# Patient Record
Sex: MALE | Race: WHITE | Employment: OTHER | URBAN - METROPOLITAN AREA
[De-identification: names, ages, dates, MRNs, and addresses within clinical notes are randomized per-mention and may not be internally consistent; named-entity substitution may affect disease eponyms.]

---

## 2017-04-14 RX ORDER — FUROSEMIDE 20 MG/1
20 TABLET ORAL DAILY
Qty: 30 TAB | Refills: 11 | Status: SHIPPED | OUTPATIENT
Start: 2017-04-14 | End: 2018-08-25 | Stop reason: SDUPTHER

## 2017-04-14 NOTE — TELEPHONE ENCOUNTER
Patient is having constant discomfort in his legs and is asking if that could be because of his medications or an indication of something else cardiac wise. Please call to discuss further. Thanks!

## 2017-04-14 NOTE — TELEPHONE ENCOUNTER
Verified patient with two identifiers. Spoke with pt, c/o legs are hurting, tight, pressure squeezing feeling. Some edema noted, was as PCP yesterday and was told BP normal, didn't ask about legs. No c/o pain, or sob. Please advise.

## 2017-04-14 NOTE — TELEPHONE ENCOUNTER
Verified patient with two identifiers. Spoke with pt, he has stopped Lasix several months ago. Advised pt to start up 20 mg daily, and call office in one week to report. Pt verbalized understanding. Lisseth Smith ANP   You 9 minutes ago (1:13 PM)                 When we saw him last he was on lasix 20 mg daily. We increased x 3 days then backed down. If he is still on daily lasix 20 mg, he may take 40mg daily x 3 days then back to 20 mg. He should have a follow up soon.

## 2017-04-19 ENCOUNTER — HOSPITAL ENCOUNTER (OUTPATIENT)
Dept: ULTRASOUND IMAGING | Age: 52
Discharge: HOME | End: 2017-04-19
Payer: COMMERCIAL

## 2017-04-19 DIAGNOSIS — R60.9 PERIPHERAL EDEMA: ICD-10-CM

## 2017-04-19 DIAGNOSIS — M79.606 LEG PAIN: ICD-10-CM

## 2017-04-19 PROCEDURE — 93970 EXTREMITY STUDY: CPT

## 2017-05-10 ENCOUNTER — OFFICE VISIT (OUTPATIENT)
Dept: CARDIOLOGY CLINIC | Age: 52
End: 2017-05-10

## 2017-05-10 VITALS
RESPIRATION RATE: 18 BRPM | WEIGHT: 301 LBS | HEART RATE: 86 BPM | DIASTOLIC BLOOD PRESSURE: 90 MMHG | SYSTOLIC BLOOD PRESSURE: 122 MMHG | OXYGEN SATURATION: 95 % | HEIGHT: 72 IN | BODY MASS INDEX: 40.77 KG/M2

## 2017-05-10 DIAGNOSIS — Z95.5 S/P CORONARY ARTERY STENT PLACEMENT: ICD-10-CM

## 2017-05-10 DIAGNOSIS — E11.9 TYPE 2 DIABETES MELLITUS WITHOUT COMPLICATION, UNSPECIFIED LONG TERM INSULIN USE STATUS: ICD-10-CM

## 2017-05-10 DIAGNOSIS — I10 ESSENTIAL HYPERTENSION WITH GOAL BLOOD PRESSURE LESS THAN 130/80: Primary | ICD-10-CM

## 2017-05-10 DIAGNOSIS — Z82.49 FAMILY HISTORY OF EARLY CAD: ICD-10-CM

## 2017-05-10 DIAGNOSIS — R60.9 DEPENDENT EDEMA: ICD-10-CM

## 2017-05-10 RX ORDER — METFORMIN HYDROCHLORIDE 500 MG/1
TABLET, EXTENDED RELEASE ORAL
Refills: 1 | COMMUNITY
Start: 2017-05-02 | End: 2021-10-04

## 2017-05-10 RX ORDER — TADALAFIL 20 MG/1
TABLET, FILM COATED ORAL
Refills: 6 | COMMUNITY
Start: 2017-04-18

## 2017-05-10 RX ORDER — AMLODIPINE BESYLATE 2.5 MG/1
2.5 TABLET ORAL DAILY
Qty: 30 TAB | Refills: 6 | Status: SHIPPED | OUTPATIENT
Start: 2017-05-10 | End: 2019-05-16

## 2017-05-10 RX ORDER — ATORVASTATIN CALCIUM 40 MG/1
TABLET, FILM COATED ORAL
Refills: 5 | COMMUNITY
Start: 2017-04-24 | End: 2019-02-21 | Stop reason: ALTCHOICE

## 2017-05-10 NOTE — PROGRESS NOTES
Chief Complaint   Patient presents with    Leg Swelling     C/O leg pain, swelling better started Metformin

## 2017-05-10 NOTE — MR AVS SNAPSHOT
Visit Information Date & Time Provider Department Dept. Phone Encounter #  
 5/10/2017  1:00 PM Eze Colindres NP [facility] [phone] Upcoming Health Maintenance Date Due DTaP/Tdap/Td series (1 - Tdap) 11/17/1988 INFLUENZA AGE 9 TO ADULT 8/1/2017 Allergies as of 5/10/2017  Review Complete On: 5/10/2017 By: Eze Colindres NP Severity Noted Reaction Type Reactions Morphine  11/19/2010   Systemic Nausea and Vomiting Current Immunizations  Never Reviewed No immunizations on file. Not reviewed this visit You Were Diagnosed With   
  
 Codes Comments Essential hypertension with goal blood pressure less than 130/80    -  Primary ICD-10-CM: I10 
ICD-9-CM: 401.9 S/P coronary artery stent placement     ICD-10-CM: Z95.5 ICD-9-CM: V45.82 Family history of early CAD     ICD-10-CM: Z82.49 
ICD-9-CM: V17.3 Dependent edema     ICD-10-CM: R60.9 ICD-9-CM: 300. 3 Type 2 diabetes mellitus without complication, unspecified long term insulin use status     ICD-10-CM: E11.9 ICD-9-CM: 250.00 Vitals BP Pulse Resp Height(growth percentile) Weight(growth percentile) SpO2  
 122/90 (BP 1 Location: Right arm, BP Patient Position: Sitting) 86 18 6' (1.829 m) 301 lb (136.5 kg) 95% BMI Smoking Status 40.82 kg/m2 Never Smoker Vitals History BMI and BSA Data Body Mass Index Body Surface Area  
 40.82 kg/m 2 2.63 m 2 Preferred Pharmacy Pharmacy Name Phone [id number] [facility] [phone] Your Updated Medication List  
  
   
This list is accurate as of: 5/10/17  1:30 PM.  Always use your most recent med list. amLODIPine 2.5 mg tablet Commonly known as:  Fitzpatrick Staggers Take 1 Tab by mouth daily. aspirin delayed-release 81 mg tablet Take 1 Tab by mouth daily. * atorvastatin 20 mg tablet Commonly known as:  LIPITOR Take 40 mg by mouth daily. * atorvastatin 40 mg tablet Commonly known as:  LIPITOR  
TAKE 1 TABLET (40 MG) BY ORAL ROUTE ONCE DAILY FOR 30 DAYS BELSOMRA 20 mg tablet Generic drug:  suvorexant Take 20 mg by mouth daily. * CIALIS 2.5 mg tablet Generic drug:  tadalafil Take 2.5 mg by mouth as needed. * CIALIS 20 mg tablet Generic drug:  tadalafil TAKE 1/2 TABLET BY MOUTH EVERY 2-3 DAYS  
  
 clopidogrel 75 mg Tab Commonly known as:  PLAVIX Take 1 Tab by mouth daily. cyclobenzaprine 10 mg tablet Commonly known as:  FLEXERIL Take 10 mg by mouth as needed. furosemide 20 mg tablet Commonly known as:  LASIX Take 1 Tab by mouth daily. losartan-hydroCHLOROthiazide 100-12.5 mg per tablet Commonly known as:  HYZAAR Take 1 Tab by mouth daily. metFORMIN  mg tablet Commonly known as:  GLUCOPHAGE XR  
TAKE 1 TABLET BY MOUTH EVERY EVENING WITH EVENING MEAL  
  
 OTHER  
  
 oxyCODONE-acetaminophen 5-325 mg per tablet Commonly known as:  PERCOCET Take 1-2 Tabs by mouth every four (4) hours as needed for Pain. * Notice: This list has 4 medication(s) that are the same as other medications prescribed for you. Read the directions carefully, and ask your doctor or other care provider to review them with you. Prescriptions Printed Refills  
 amLODIPine (NORVASC) 2.5 mg tablet 6 Sig: Take 1 Tab by mouth daily. Class: Print Route: Oral  
  
We Performed the Following AMB POC EKG ROUTINE W/ 12 LEADS, INTER & REP [94974 CPT(R)] To-Do List   
 05/11/2017 Imaging:  DUPLEX LOWER EXT VENOUS BILAT   
  
 05/12/2017 Imaging:  ANKLE BRACHIAL INDEX   
  
 05/16/2017 ECHO:  2D ECHO COMPLETE ADULT (TTE) W OR WO CONTR Introducing [facility]!    
 [facility] introduces "[employer] patient portal. Now you can access parts of your medical record, email your doctor's office, and request medication refills online. 1. In your internet browser, go to [email or web address] 2. Click on the First Time User? Click Here link in the Sign In box. You will see the New Member Sign Up page. 3. Enter your [employer] Access Code exactly as it appears below. You will not need to use this code after youve completed the sign-up process. If you do not sign up before the expiration date, you must request a new code. · [employer] Access Code: [email or web address] Expires: 7/17/2017 11:27 AM 
 
4. Enter the last four digits of your Social Security Number (xxxx) and Date of Birth (mm/dd/yyyy) as indicated and click Submit. You will be taken to the next sign-up page. 5. Create a [employer] ID. This will be your [employer] login ID and cannot be changed, so think of one that is secure and easy to remember. 6. Create a [employer] password. You can change your password at any time. 7. Enter your Password Reset Question and Answer. This can be used at a later time if you forget your password. 8. Enter your e-mail address. You will receive e-mail notification when new information is available in [address] 9. Click Sign Up. You can now view and download portions of your medical record. 10. Click the Download Summary menu link to download a portable copy of your medical information. If you have questions, please visit the Frequently Asked Questions section of the [employer] website. Remember, [employer] is NOT to be used for urgent needs. For medical emergencies, dial 911. Now available from your iPhone and Android! Please provide this summary of care documentation to your next provider. Your primary care clinician is listed as Дмитрий Pérez. If you have any questions after today's visit, please call [phone].

## 2017-05-10 NOTE — PROGRESS NOTES
Russ Maria NP  Subjective:    Jaqueline Friedman is a 54 y.o. male is here for symptom based appt. The patient presents with episodes of increased bilateral dependent edema not responding to diuretics, associated numbness tingling and generalized discomfort. Was recently discovered to be hyperglycemic started on metformin and states has significant improvement in his legs as far swelling however continues to get easily cramps and legs will ache with minimal amounts of exertional activity for example using an elliptical .     Patient Active Problem List    Diagnosis Date Noted    Type 2 diabetes mellitus without complication ([facility] [address] 44/46/5059    Abnormal nuclear cardiac imaging test 08/23/2016    S/P coronary artery stent placement 08/23/2016    Chest pain 08/09/2016    Essential hypertension with goal blood pressure less than 130/80 08/09/2016    Agatston coronary artery calcium score greater than 400 08/09/2016    High cholesterol 08/09/2016    Family history of early CAD 08/09/2016    AC (acromioclavicular) joint arthritis 11/24/2010      Mitchell Lyon NP  Past Medical History:   Diagnosis Date    Abnormal nuclear cardiac imaging test 8/23/2016    Diabetes [facility])     Essential hypertension     Hyperlipidemia     S/P coronary artery stent placement 8/23/2016 8/22/16 PCI/TABATHA to LAD    Unspecified sleep apnea     had study and told CPAP not needed      Past Surgical History:   Procedure Laterality Date    HX ORTHOPAEDIC      LEFT SHOULDER 3 SURGERIES BACK 1 SURGERY     Allergies   Allergen Reactions    Morphine Nausea and Vomiting      Family History   Problem Relation Age of Onset    Diabetes Mother     Heart Disease Mother     Cancer Father     Cancer Brother       Current Outpatient Prescriptions   Medication Sig    atorvastatin (LIPITOR) 40 mg tablet TAKE 1 TABLET (40 MG) BY ORAL ROUTE ONCE DAILY FOR 30 DAYS    metFORMIN ER (GLUCOPHAGE XR) 500 mg tablet TAKE 1 TABLET BY MOUTH EVERY EVENING WITH EVENING MEAL    CIALIS 20 mg tablet TAKE 1/2 TABLET BY MOUTH EVERY 2-3 DAYS    amLODIPine (NORVASC) 2.5 mg tablet Take 1 Tab by mouth daily.  furosemide (LASIX) 20 mg tablet Take 1 Tab by mouth daily.  OTHER     clopidogrel (PLAVIX) 75 mg tablet Take 1 Tab by mouth daily.  losartan-hydrochlorothiazide (HYZAAR) 100-12.5 mg per tablet Take 1 Tab by mouth daily.  BELSOMRA 20 mg tablet Take 20 mg by mouth daily.  aspirin delayed-release 81 mg tablet Take 1 Tab by mouth daily.  atorvastatin (LIPITOR) 20 mg tablet Take 40 mg by mouth daily.  CIALIS 2.5 mg tablet Take 2.5 mg by mouth as needed.  cyclobenzaprine (FLEXERIL) 10 mg tablet Take 10 mg by mouth as needed.  oxycodone-acetaminophen (PERCOCET) 5-325 mg per tablet Take 1-2 Tabs by mouth every four (4) hours as needed for Pain. No current facility-administered medications for this visit. Vitals:    05/10/17 1255 05/10/17 1308   BP: 120/80 122/90   Pulse: 86    Resp: 18    SpO2: 95%    Weight: 301 lb (136.5 kg)    Height: 6' (1.829 m)      Social History     Social History    Marital status:      Spouse name: N/A    Number of children: N/A    Years of education: N/A     Occupational History    Not on file. Social History Main Topics    Smoking status: Never Smoker    Smokeless tobacco: Never Used    Alcohol use 0.5 oz/week     1 Glasses of wine per week    Drug use: No    Sexual activity: Not on file     Other Topics Concern    Not on file     Social History Narrative       I have reviewed the nurses notes, vitals, problem list, allergy list, medical history, family medical, social history and medications. Review of Symptoms:    General: Pt denies excessive weight gain or loss. Pt is able to conduct ADL's  HEENT: Denies blurred vision, headaches, epistaxis and difficulty swallowing. Respiratory: Denies shortness of breath, HOBBS, wheezing or stridor.   Cardiovascular: Denies precordial pain, palpitations, + edema no PND  Gastrointestinal: Denies poor appetite, indigestion, abdominal pain or blood in stool  Musculoskeletal: Denies pain or swelling from muscles or joints  Neurologic: Denies tremor, paresthesias, or sensory motor disturbance  Skin: Denies rash, itching or texture change. Physical Exam:      General: Well developed, in no acute distress. HEENT: No carotid bruits, no JVD, trach is midline. Heart:  Normal S1/S2 negative S3 or S4. Regular, no murmur, gallop or rub.   Respiratory: Clear bilaterally x 4, no wheezing or rales  Abdomen:   Soft, non-tender, bowel sounds are active.   Extremities: Trace bilateral ankle edema with right leg slightly larger than the left, normal cap refill, no cyanosis. Neuro: A&Ox3, speech clear, gait stable. Skin: Skin color is normal. No rashes or lesions.  Non diaphoretic  Vascular: 2+ pulses symmetric in all extremities      Cardiographics    ECG:   Results for orders placed or performed during the hospital encounter of 08/22/16   EKG, 12 LEAD, INITIAL   Result Value Ref Range    Ventricular Rate 60 BPM    Atrial Rate 60 BPM    P-R Interval 202 ms    QRS Duration 100 ms    Q-T Interval 426 ms    QTC Calculation (Bezet) 426 ms    Calculated P Axis 57 degrees    Calculated R Axis -14 degrees    Calculated T Axis 12 degrees    Diagnosis       Normal sinus rhythm  Cannot rule out Anterior infarct , age undetermined  No previous ECGs available  Confirmed by Beatrice Wheatley P.WILIAN ([id number]) on 8/23/2016 7:29:45 AM          Labs:  Lab Results   Component Value Date/Time    Sodium 139 11/03/2016 08:36 AM    Potassium 4.3 11/03/2016 08:36 AM    Chloride 101 11/03/2016 08:36 AM    CO2 23 11/03/2016 08:36 AM    Anion gap 15 11/19/2010 02:12 PM    Glucose 119 11/03/2016 08:36 AM    BUN 13 11/03/2016 08:36 AM    Creatinine 1.00 11/03/2016 08:36 AM    BUN/Creatinine ratio 13 11/03/2016 08:36 AM    GFR est  11/03/2016 08:36 AM    GFR est non-AA 89 11/03/2016 08:36 AM    Calcium 9.7 11/03/2016 08:36 AM    Bilirubin, total 1.0 08/09/2016 11:50 AM    AST (SGOT) 18 08/09/2016 11:50 AM    Alk. phosphatase 43 08/09/2016 11:50 AM    Protein, total 6.7 08/09/2016 11:50 AM    Albumin 4.2 08/09/2016 11:50 AM    Globulin 3.6 03/27/2009 11:13 AM    A-G Ratio 1.7 08/09/2016 11:50 AM    ALT (SGPT) 29 08/09/2016 11:50 AM      Lab Results   Component Value Date/Time    WBC 6.3 08/09/2016 11:50 AM    HGB 16.1 08/09/2016 11:50 AM    HCT 45.8 08/09/2016 11:50 AM    PLATELET 084 42/89/3222 11:50 AM    MCV 90 08/09/2016 11:50 AM    All Cardiac Markers in the last 24 hours:  No results found for: CPK, CKMMB, CKMB, RCK3, CKMBT, CKNDX, CKND1, PREM, TROPT, TROIQ, JACKIE, TROPT, TNIPOC, BNP, BNPP              Assessment:     Assessment:         ICD-10-CM ICD-9-CM    1. Essential hypertension with goal blood pressure less than 130/80 I10 401.9 AMB POC EKG ROUTINE W/ 12 LEADS, INTER & REP      2D ECHO COMPLETE ADULT (TTE) W OR WO CONTR      ANKLE BRACHIAL INDEX      DUPLEX LOWER EXT VENOUS BILAT   2. S/P coronary artery stent placement Z95.5 V45.82 2D ECHO COMPLETE ADULT (TTE) W OR WO CONTR      ANKLE BRACHIAL INDEX      DUPLEX LOWER EXT VENOUS BILAT   3. Family history of early CAD Z80.55 V17.3 2D ECHO COMPLETE ADULT (TTE) W OR WO CONTR      ANKLE BRACHIAL INDEX      DUPLEX LOWER EXT VENOUS BILAT   4. Dependent edema R60.9 782.3 2D ECHO COMPLETE ADULT (TTE) W OR WO CONTR      ANKLE BRACHIAL INDEX      DUPLEX LOWER EXT VENOUS BILAT   5.  Type 2 diabetes mellitus without complication, unspecified long term insulin use status E11.9 250.00 2D ECHO COMPLETE ADULT (TTE) W OR WO CONTR      ANKLE BRACHIAL INDEX      DUPLEX LOWER EXT VENOUS BILAT       Orders Placed This Encounter    ANKLE BRACHIAL INDEX     Standing Status:   Future     Standing Expiration Date:   6/10/2018     Order Specific Question:   Reason for Exam     Answer:   leg pain w/ ambulation    DUPLEX LOWER EXT VENOUS BILAT     Standing Status: Future     Standing Expiration Date:   6/10/2018    AMB POC EKG ROUTINE W/ 12 LEADS, INTER & REP     Order Specific Question:   Reason for Exam:     Answer:   routine    2D ECHO COMPLETE ADULT (TTE) W OR WO CONTR     Standing Status:   Future     Standing Expiration Date:   11/10/2017     Order Specific Question:   Reason for Exam:     Answer:   edema     Order Specific Question:   Contrast Enhancement (Bubble Study, Definity, Optison) may be used if criteria listed in established evidence-based protocol has been identified. Answer: Yes    atorvastatin (LIPITOR) 40 mg tablet     Sig: TAKE 1 TABLET (40 MG) BY ORAL ROUTE ONCE DAILY FOR 30 DAYS     Refill:  5    metFORMIN ER (GLUCOPHAGE XR) 500 mg tablet     Sig: TAKE 1 TABLET BY MOUTH EVERY EVENING WITH EVENING MEAL     Refill:  1    CIALIS 20 mg tablet     Sig: TAKE 1/2 TABLET BY MOUTH EVERY 2-3 DAYS     Refill:  6    amLODIPine (NORVASC) 2.5 mg tablet     Sig: Take 1 Tab by mouth daily. Dispense:  30 Tab     Refill:  6        Plan:     Patient is a 33-year-old male with known history of atherosclerotic heart disease now recently diagnosed with diabetes presenting with bilateral leg pain and cramping with associated swelling. Has improved with the addition of metformin for hyperglycemic control. Given degree of atherosclerotic heart disease at a young age, will screen for peripheral arterial and vascular disease with WALT and venous reflux study. Differential diagnosis for edema diastolic dysfunction, CHF will evaluate with 2D echocardiogram.    Newly diagnosed diabetes: Discussed with patient diet and exercise regimen, instructed patient to review  Services has upcoming nutritional appointment scheduled by primary care. Long-term goal set by patient is a 80 pound weight loss discussed with him short-term would be 20 pound weight loss returning him to his weight in 2016.   If studies are normal follow-up as planned with  Tom.     Alejandra Patel, NP

## 2017-05-15 ENCOUNTER — TELEPHONE (OUTPATIENT)
Dept: CARDIOLOGY CLINIC | Age: 52
End: 2017-05-15

## 2017-05-15 ENCOUNTER — OFFICE VISIT (OUTPATIENT)
Dept: CARDIOLOGY CLINIC | Age: 52
End: 2017-05-15

## 2017-05-15 DIAGNOSIS — M79.604 PAIN IN BOTH LOWER EXTREMITIES: Primary | ICD-10-CM

## 2017-05-15 DIAGNOSIS — M79.605 PAIN IN BOTH LOWER EXTREMITIES: Primary | ICD-10-CM

## 2017-05-17 ENCOUNTER — TELEPHONE (OUTPATIENT)
Dept: CARDIOLOGY CLINIC | Age: 52
End: 2017-05-17

## 2017-05-17 NOTE — TELEPHONE ENCOUNTER
Verified patient with two identifiers. Pt called to update med list.  He is not taking the amlodipine. He also states his legs have been hurting worse, he can barely walk. He feels the leg pain has progressed since the increase in lipitor. He called his PCP and she advised that he stop the lipitor for a few days to see if it makes a difference. His WALT was normal.  He is scheduled for a venous doppler on May 19. He will keep us posted on any change in symptoms.

## 2017-05-19 ENCOUNTER — CLINICAL SUPPORT (OUTPATIENT)
Dept: CARDIOLOGY CLINIC | Age: 52
End: 2017-05-19

## 2017-05-19 ENCOUNTER — OFFICE VISIT (OUTPATIENT)
Dept: CARDIOLOGY CLINIC | Age: 52
End: 2017-05-19

## 2017-05-19 DIAGNOSIS — Z95.5 S/P CORONARY ARTERY STENT PLACEMENT: ICD-10-CM

## 2017-05-19 DIAGNOSIS — I10 ESSENTIAL HYPERTENSION WITH GOAL BLOOD PRESSURE LESS THAN 130/80: ICD-10-CM

## 2017-05-19 DIAGNOSIS — Z82.49 FAMILY HISTORY OF EARLY CAD: ICD-10-CM

## 2017-05-19 DIAGNOSIS — R60.9 DEPENDENT EDEMA: ICD-10-CM

## 2017-05-19 DIAGNOSIS — E11.9 TYPE 2 DIABETES MELLITUS WITHOUT COMPLICATION, UNSPECIFIED LONG TERM INSULIN USE STATUS: ICD-10-CM

## 2017-05-23 ENCOUNTER — TELEPHONE (OUTPATIENT)
Dept: CARDIOLOGY CLINIC | Age: 52
End: 2017-05-23

## 2017-05-23 NOTE — TELEPHONE ENCOUNTER
----- Message from Davonte Argueta NP sent at 5/23/2017  3:31 PM EDT -----  Please call patient, ECHO is normal.

## 2017-06-23 ENCOUNTER — TELEPHONE (OUTPATIENT)
Dept: CARDIOLOGY CLINIC | Age: 52
End: 2017-06-23

## 2017-06-23 NOTE — TELEPHONE ENCOUNTER
----- Message from Yesenia Gomez MD sent at 6/23/2017  6:45 AM EDT -----  Venous dopplers normal, thx.

## 2017-06-23 NOTE — TELEPHONE ENCOUNTER
Verified patient with two identifiers. Spoke with Lisseth on HIPAA  regarding venous doppler  results.

## 2017-07-20 ENCOUNTER — TELEPHONE (OUTPATIENT)
Dept: CARDIOLOGY CLINIC | Age: 52
End: 2017-07-20

## 2017-07-20 NOTE — TELEPHONE ENCOUNTER
Verified patient with two identifiers. Spoke with Lisseth on HIPAA, advising her to have surgeons office fax clearance request for surgery. Pt will be 1 yr of Plavix in August and will stop Plavix then, so it will not be an issue to stop for surgery. Lisseth indicated  will fax on Monday the clearance note.

## 2017-07-20 NOTE — TELEPHONE ENCOUNTER
Verified patient with two identifiers. Spoke with Lisseth on HIPAA. 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 Last office visit on 5/10 on Plavix, aspirin.   Will discuss with Dr. Dana Warren

## 2017-07-20 NOTE — TELEPHONE ENCOUNTER
Patient's wife call and said her  has been dx w/something that requires surgery and she wants to discuss. Please call.

## 2018-02-20 ENCOUNTER — HOSPITAL ENCOUNTER (OUTPATIENT)
Dept: GENERAL RADIOLOGY | Age: 53
Discharge: HOME | End: 2018-02-20
Payer: COMMERCIAL

## 2018-02-20 DIAGNOSIS — M54.17 LUMBOSACRAL RADICULITIS: ICD-10-CM

## 2018-02-20 PROCEDURE — 72110 X-RAY EXAM L-2 SPINE 4/>VWS: CPT

## 2018-05-01 ENCOUNTER — HOSPITAL ENCOUNTER (OUTPATIENT)
Dept: GENERAL RADIOLOGY | Age: 53
Discharge: HOME | End: 2018-05-01
Payer: COMMERCIAL

## 2018-05-01 DIAGNOSIS — J34.89 SINUS PAIN: ICD-10-CM

## 2018-05-01 PROCEDURE — 70220 X-RAY EXAM OF SINUSES: CPT

## 2018-08-22 ENCOUNTER — TELEPHONE (OUTPATIENT)
Dept: CARDIOLOGY CLINIC | Age: 53
End: 2018-08-22

## 2018-08-27 RX ORDER — FUROSEMIDE 20 MG/1
TABLET ORAL
Qty: 30 TAB | Refills: 2 | Status: SHIPPED | OUTPATIENT
Start: 2018-08-27 | End: 2018-11-17 | Stop reason: SDUPTHER

## 2018-08-28 ENCOUNTER — HOSPITAL ENCOUNTER (OUTPATIENT)
Dept: CT IMAGING | Age: 53
Discharge: HOME | End: 2018-08-28
Attending: OTOLARYNGOLOGY
Payer: COMMERCIAL

## 2018-08-28 DIAGNOSIS — J32.4 CHRONIC PANSINUSITIS: ICD-10-CM

## 2018-08-28 PROCEDURE — 70486 CT MAXILLOFACIAL W/O DYE: CPT

## 2018-09-05 ENCOUNTER — OFFICE VISIT (OUTPATIENT)
Dept: CARDIOLOGY CLINIC | Age: 53
End: 2018-09-05

## 2018-09-05 VITALS
HEART RATE: 75 BPM | WEIGHT: 300.9 LBS | DIASTOLIC BLOOD PRESSURE: 80 MMHG | OXYGEN SATURATION: 98 % | SYSTOLIC BLOOD PRESSURE: 128 MMHG | BODY MASS INDEX: 40.76 KG/M2 | RESPIRATION RATE: 20 BRPM | HEIGHT: 72 IN

## 2018-09-05 DIAGNOSIS — Z82.49 FAMILY HISTORY OF EARLY CAD: ICD-10-CM

## 2018-09-05 DIAGNOSIS — I10 ESSENTIAL HYPERTENSION WITH GOAL BLOOD PRESSURE LESS THAN 130/80: Primary | ICD-10-CM

## 2018-09-05 DIAGNOSIS — I25.10 ASHD (ARTERIOSCLEROTIC HEART DISEASE): ICD-10-CM

## 2018-09-05 DIAGNOSIS — E78.00 HIGH CHOLESTEROL: ICD-10-CM

## 2018-09-05 DIAGNOSIS — Z95.5 S/P CORONARY ARTERY STENT PLACEMENT: ICD-10-CM

## 2018-09-05 DIAGNOSIS — E11.9 TYPE 2 DIABETES MELLITUS WITHOUT COMPLICATION, UNSPECIFIED WHETHER LONG TERM INSULIN USE (HCC): ICD-10-CM

## 2018-09-05 RX ORDER — TRAZODONE HYDROCHLORIDE 150 MG/1
150 TABLET ORAL
Refills: 11 | COMMUNITY
Start: 2018-08-23

## 2018-09-05 NOTE — PROGRESS NOTES
Titus Scheuermann DNP, ANP-BC  Subjective/HPI:     Fabiola Bolden is a 50 y.o. male is here for symptom based appointment. Mr. Shiela Knox reports in the last 1-2 months he is having episodes of unusual dyspnea on exertion especially climbing a flight of stairs, increasing fatigue. He also admits to some anterior chest discomfort. He has a history of PTCA stenting to the LAD performed nearly 2 years ago, he also is known to have a 40% lesion in D2 and 30% lesion in the RCA. PCP Provider  Heather Galloway NP  Past Medical History:   Diagnosis Date    Abnormal nuclear cardiac imaging test 8/23/2016    Diabetes [facility])     Essential hypertension     Hyperlipidemia     S/P coronary artery stent placement 8/23/2016 8/22/16 PCI/TABATHA to LAD    Unspecified sleep apnea     had study and told CPAP not needed      Past Surgical History:   Procedure Laterality Date    HX ORTHOPAEDIC      LEFT SHOULDER 3 SURGERIES BACK 1 SURGERY     Allergies   Allergen Reactions    Morphine Nausea and Vomiting      Family History   Problem Relation Age of Onset    Diabetes Mother     Heart Disease Mother     Cancer Father     Cancer Brother       Current Outpatient Prescriptions   Medication Sig    traZODone (DESYREL) 150 mg tablet Take 150 mg by mouth nightly.  furosemide (LASIX) 20 mg tablet TAKE 1 TAB BY MOUTH DAILY.  atorvastatin (LIPITOR) 40 mg tablet TAKE 1 TABLET (40 MG) BY ORAL ROUTE ONCE DAILY FOR 30 DAYS    metFORMIN ER (GLUCOPHAGE XR) 500 mg tablet TAKE 1 TABLET BY MOUTH EVERY EVENING WITH EVENING MEAL    amLODIPine (NORVASC) 2.5 mg tablet Take 1 Tab by mouth daily.  CIALIS 2.5 mg tablet Take 2.5 mg by mouth as needed.  aspirin delayed-release 81 mg tablet Take 1 Tab by mouth daily.  CIALIS 20 mg tablet TAKE 1/2 TABLET BY MOUTH EVERY 2-3 DAYS    clopidogrel (PLAVIX) 75 mg tablet Take 1 Tab by mouth daily.  losartan-hydrochlorothiazide (HYZAAR) 100-12.5 mg per tablet Take 1 Tab by mouth daily.     BELSOMRA 20 mg tablet Take 20 mg by mouth daily.  cyclobenzaprine (FLEXERIL) 10 mg tablet Take 10 mg by mouth as needed.  oxycodone-acetaminophen (PERCOCET) 5-325 mg per tablet Take 1-2 Tabs by mouth every four (4) hours as needed for Pain. No current facility-administered medications for this visit. Vitals:    09/05/18 1435 09/05/18 1444   BP: 130/80 128/80   Pulse: 75    Resp: 20    SpO2: 98%    Weight: 300 lb 14.4 oz (136.5 kg)    Height: 6' (1.829 m)      Social History     Social History    Marital status:      Spouse name: N/A    Number of children: N/A    Years of education: N/A     Occupational History    Not on file. Social History Main Topics    Smoking status: Never Smoker    Smokeless tobacco: Never Used    Alcohol use 0.5 oz/week     1 Glasses of wine per week    Drug use: No    Sexual activity: Not on file     Other Topics Concern    Not on file     Social History Narrative       I have reviewed the nurses notes, vitals, problem list, allergy list, medical history, family, social history and medications. Review of Symptoms:    General: Pt denies excessive weight gain or loss. Pt is able to conduct ADL's  HEENT: Denies blurred vision, headaches, epistaxis and difficulty swallowing. Respiratory: Denies shortness of breath, + HOBBS, denies wheezing or stridor. Cardiovascular: + Chest discomfort, denies palpitations, edema or PND  Gastrointestinal: Denies poor appetite, indigestion, abdominal pain or blood in stool  Musculoskeletal: Denies pain or swelling from muscles or joints  Neurologic: Denies tremor, paresthesias, or sensory motor disturbance  Skin: Denies rash, itching or texture change. Physical Exam:      General: Well developed, in no acute distress, cooperative and alert  HEENT: No carotid bruits, no JVD, trach is midline. Neck Supple, PEERL, EOM intact. Heart:  Normal S1/S2 negative S3 or S4.  Regular, no murmur, gallop or rub.   Respiratory: Clear bilaterally x 4, no wheezing or rales  Abdomen:   Soft, non-tender, no masses, bowel sounds are active.   Extremities:  No edema, normal cap refill, no cyanosis, atraumatic. Neuro: A&Ox3, speech clear, gait stable. Skin: Skin color is normal. No rashes or lesions. Non diaphoretic  Vascular: 2+ pulses symmetric in all extremities    Cardiographics    ECG: Normal sinus rhythm  Results for orders placed or performed during the hospital encounter of 08/22/16   EKG, 12 LEAD, INITIAL   Result Value Ref Range    Ventricular Rate 60 BPM    Atrial Rate 60 BPM    P-R Interval 202 ms    QRS Duration 100 ms    Q-T Interval 426 ms    QTC Calculation (Bezet) 426 ms    Calculated P Axis 57 degrees    Calculated R Axis -14 degrees    Calculated T Axis 12 degrees    Diagnosis       Normal sinus rhythm  Cannot rule out Anterior infarct , age undetermined  No previous ECGs available  Confirmed by ZAIRA Shell ([id number]) on 8/23/2016 7:29:45 AM           Cardiology Labs:  Lab Results   Component Value Date/Time    Cholesterol, total 134 08/23/2016 03:29 AM    HDL Cholesterol 37 08/23/2016 03:29 AM    LDL, calculated 65.2 08/23/2016 03:29 AM    Triglyceride 159 (H) 08/23/2016 03:29 AM    CHOL/HDL Ratio 3.6 08/23/2016 03:29 AM       Lab Results   Component Value Date/Time    Sodium 139 11/03/2016 08:36 AM    Potassium 4.3 11/03/2016 08:36 AM    Chloride 101 11/03/2016 08:36 AM    CO2 23 11/03/2016 08:36 AM    Anion gap 15 11/19/2010 02:12 PM    Glucose 119 (H) 11/03/2016 08:36 AM    BUN 13 11/03/2016 08:36 AM    Creatinine 1.00 11/03/2016 08:36 AM    BUN/Creatinine ratio 13 11/03/2016 08:36 AM    GFR est  11/03/2016 08:36 AM    GFR est non-AA 89 11/03/2016 08:36 AM    Calcium 9.7 11/03/2016 08:36 AM    Bilirubin, total 1.0 08/09/2016 11:50 AM    AST (SGOT) 18 08/09/2016 11:50 AM    Alk.  phosphatase 43 08/09/2016 11:50 AM    Protein, total 6.7 08/09/2016 11:50 AM    Albumin 4.2 08/09/2016 11:50 AM    Globulin 3.6 03/27/2009 11:13 AM    A-G Ratio 1.7 08/09/2016 11:50 AM    ALT (SGPT) 29 08/09/2016 11:50 AM           Assessment:     Assessment:     Diagnoses and all orders for this visit:    1. Essential hypertension with goal blood pressure less than 130/80  -     AMB POC EKG ROUTINE W/ 12 LEADS, INTER & REP  -     METABOLIC PANEL, COMPREHENSIVE; Future  -     CK; Future  -     LIPID PANEL; Future  -     STRESS TEST CARDIOLITE, Clinic Performed; Future  -     2D ECHO COMPLETE ADULT (TTE) W OR WO CONTR    2. High cholesterol    3. Family history of early CAD    3. Type 2 diabetes mellitus without complication, unspecified whether long term insulin use ([facility] [address]    5. S/P coronary artery stent placement    6. ASHD (arteriosclerotic heart disease)        ICD-10-CM ICD-9-CM    1. Essential hypertension with goal blood pressure less than 130/80 I10 401.9 AMB POC EKG ROUTINE W/ 12 LEADS, INTER & REP      METABOLIC PANEL, COMPREHENSIVE      CK      LIPID PANEL      STRESS TEST CARDIOLITE      2D ECHO COMPLETE ADULT (TTE) W OR WO CONTR   2. High cholesterol E78.00 272.0    3. Family history of early CAD Z82.49 V17.3    4. Type 2 diabetes mellitus without complication, unspecified whether long term insulin use ([facility]) E11.9 250.00    5. S/P coronary artery stent placement Z95.5 V45.82    6.  ASHD (arteriosclerotic heart disease) I25.10 414.00      Orders Placed This Encounter    METABOLIC PANEL, COMPREHENSIVE     Standing Status:   Future     Standing Expiration Date:   3/5/2019    CK     Standing Status:   Future     Standing Expiration Date:   3/5/2019    LIPID PANEL     Standing Status:   Future     Standing Expiration Date:   3/5/2019    AMB POC EKG ROUTINE W/ 12 LEADS, INTER & REP     Order Specific Question:   Reason for Exam:     Answer:   ROUTINE    STRESS TEST CARDIOLITE, Clinic Performed     Standing Status:   Future     Standing Expiration Date:   3/5/2019     Order Specific Question:   Reason for Exam:     Answer:   CAD w/ HOBBS    2D ECHO COMPLETE ADULT (TTE) W OR WO CONTR     Order Specific Question:   Reason for Exam:     Answer:   HOBBS     Order Specific Question:   Contrast Enhancement (Bubble Study, Definity, Optison) may be used if criteria listed in established evidence-based protocol has been identified. Answer: Yes    traZODone (DESYREL) 150 mg tablet     Sig: Take 150 mg by mouth nightly. Refill:  11        Plan:     Patient is a 53-year-old male with a history of atherosclerotic heart disease, diabetes, hypertension hyperlipidemia presenting with episodes of increasing dyspnea on exertion anterior chest discomfort. He is 2 years post PTCA stenting in the LAD, he is known to have 30% lesion the RCA 40% lesion in D2. Per patient he has been off statin therapy for at least one year has recently resumed this month after lab work back from primary care indicated hyperlipidemia. 1.  Atherosclerotic heart disease: Symptoms consistent with angina functional class II will evaluate for ischemia with nuclear stress test and echocardiogram  2. Hypertension: Controlled 120/80 continue amlodipine  3. Hyperlipidemia: He is back on atorvastatin 40 mg, I will recheck lipid panel in 3 months. Goal LDL is less than or equal to 70, given history of atherosclerotic heart disease and type 2 diabetes is ideal this patient remain on statin therapy lifelong. Follow-up after testing complete. Jai Grayson NP    This note was created using voice recognition software. Despite editing, there may be syntax errors.

## 2018-09-05 NOTE — MR AVS SNAPSHOT
[address] [facility] 
[phone] Patient: Asia Mcgill MRN: [id number] :1965 Visit Information Date & Time Provider Department Dept. Phone Encounter #  
 2018  2:30 PM Scot Hernandez NP [facility] [phone] [id number] Follow-up Instructions Return in about 4 weeks (around 10/3/2018). Your Appointments 2018  3:30 PM  
ECHO CARDIOGRAMS 2D with [address]  [facility] Appt Note: 2D ECHO COMPLETE ADULT (TTE) W OR WO CONTR [[id number]] (Order [id number]) 18 kb  
 [address] [facility]  
[phone] [address] [facility]  
  
    
 2018  1:00 PM  
NUCLEAR MEDICINE with NUCLEAR, [facility]) Appt Note: STRESS TEST CARDIOLITE [[id number]] (Order [id number]) 6' 300 lbs 18 kb  
 [address] [facility]  
[phone] [address] [facility]  
  
    
 2018  1:00 PM  
NUCLEAR MEDICINE with NUCLEAR, [facility]) Appt Note: STRESS TEST CARDIOLITE [[id number]] (Order [id number]) 6' 300 lbs 18 kb  
 [address] [facility]  
[phone] Upcoming Health Maintenance Date Due HEMOGLOBIN A1C Q6M 1965 FOOT EXAM Q1 1977 MICROALBUMIN Q1 1977 EYE EXAM RETINAL OR DILATED Q1 1977 Pneumococcal 19-64 Medium Risk (1 of 1 - PPSV23) 1986 DTaP/Tdap/Td series (1 - Tdap) 1988 LIPID PANEL Q1 2017 FOBT Q 1 YEAR AGE 50-75 2017 Influenza Age 5 to Adult 2018 Allergies as of 2018  Review Complete On: 2018 By: Scot Servant, NP Severity Noted Reaction Type Reactions Morphine  2010   Systemic Nausea and Vomiting Current Immunizations  Never Reviewed No immunizations on file. Not reviewed this visit You Were Diagnosed With   
  
 Codes Comments Essential hypertension with goal blood pressure less than 130/80    -  Primary ICD-10-CM: I10 
ICD-9-CM: 401.9 High cholesterol     ICD-10-CM: E78.00 ICD-9-CM: 272.0 Family history of early CAD     ICD-10-CM: Z82.49 
ICD-9-CM: V17.3 Type 2 diabetes mellitus without complication, unspecified whether long term insulin use (HCC)     ICD-10-CM: E11.9 ICD-9-CM: 250.00 S/P coronary artery stent placement     ICD-10-CM: Z95.5 ICD-9-CM: V41.80   
 ASHD (arteriosclerotic heart disease)     ICD-10-CM: I25.10 ICD-9-CM: 414.00 Vitals BP Pulse Resp Height(growth percentile) Weight(growth percentile) SpO2  
 128/80 (BP 1 Location: Right arm, BP Patient Position: Sitting) 75 20 6' (1.829 m) 300 lb 14.4 oz (136.5 kg) 98% BMI Smoking Status 40.81 kg/m2 Never Smoker Vitals History BMI and BSA Data Body Mass Index Body Surface Area  
 40.81 kg/m 2 2.63 m 2 Preferred Pharmacy Pharmacy Name Phone [id number] [facility] [phone] Your Updated Medication List  
  
   
This list is accurate as of 9/5/18  3:21 PM.  Always use your most recent med list. amLODIPine 2.5 mg tablet Commonly known as:  Welsh Fanti Take 1 Tab by mouth daily. aspirin delayed-release 81 mg tablet Take 1 Tab by mouth daily. atorvastatin 40 mg tablet Commonly known as:  LIPITOR  
TAKE 1 TABLET (40 MG) BY ORAL ROUTE ONCE DAILY FOR 30 DAYS BELSOMRA 20 mg tablet Generic drug:  suvorexant Take 20 mg by mouth daily. * CIALIS 2.5 mg tablet Generic drug:  tadalafil Take 2.5 mg by mouth as needed. * CIALIS 20 mg tablet Generic drug:  tadalafil TAKE 1/2 TABLET BY MOUTH EVERY 2-3 DAYS  
  
 clopidogrel 75 mg Tab Commonly known as:  PLAVIX Take 1 Tab by mouth daily. cyclobenzaprine 10 mg tablet Commonly known as:  FLEXERIL Take 10 mg by mouth as needed. furosemide 20 mg tablet Commonly known as:  LASIX TAKE 1 TAB BY MOUTH DAILY. losartan-hydroCHLOROthiazide 100-12.5 mg per tablet Commonly known as:  HYZAAR Take 1 Tab by mouth daily. metFORMIN  mg tablet Commonly known as:  GLUCOPHAGE XR  
TAKE 1 TABLET BY MOUTH EVERY EVENING WITH EVENING MEAL  
  
 oxyCODONE-acetaminophen 5-325 mg per tablet Commonly known as:  PERCOCET Take 1-2 Tabs by mouth every four (4) hours as needed for Pain. traZODone 150 mg tablet Commonly known as:  Orma Paddy Take 150 mg by mouth nightly. * Notice: This list has 2 medication(s) that are the same as other medications prescribed for you. Read the directions carefully, and ask your doctor or other care provider to review them with you. We Performed the Following 2D ECHO COMPLETE ADULT (TTE) W OR WO CONTR [85093 CPT(R)] AMB POC EKG ROUTINE W/ 12 LEADS, INTER & REP [19206 CPT(R)] Follow-up Instructions Return in about 4 weeks (around 10/3/2018). To-Do List   
 09/12/2018 ECG:  STRESS TEST CARDIOLITE   
  
 11/21/2018 Lab:  CK   
  
 11/21/2018 Lab:  LIPID PANEL   
  
 11/21/2018 Lab:  METABOLIC PANEL, [facility]! Dear Mel Cline: Thank you for requesting a [facility] account. Our records indicate that you already have an active [facility] account. You can access your account anytime at [email or web address] Did you know that you can access your hospital and ER discharge instructions at any time in [facility]? You can also review all of your test results from your hospital stay or ER visit. Additional Information If you have questions, please visit the Frequently Asked Questions section of the [email or web address] website at [email or web address] Remember, [employer] is NOT to be used for urgent needs. For medical emergencies, dial 911. Now available from your iPhone and Android! Please provide this summary of care documentation to your next provider. Your primary care clinician is listed as Constance Leach. If you have any questions after today's visit, please call [phone].

## 2018-09-05 NOTE — LETTER
9/5/2018 3:21 PM 
 
Patient:  Michael Rodríguez YOB: 1965 Date of Visit: 9/5/2018 Dear Asya Garcia, GUNJAN 
[address] [address] VIA Facsimile: [phone] 
 : 
 
 
Thank you for referring Mr. Nate Hernandez to me for evaluation/treatment. Below are the relevant portions of my assessment and plan of care. If you have questions, please do not hesitate to call me. I look forward to following Mr. Dennis Beaver along with you. Sincerely, Jai Grayson NP

## 2018-09-05 NOTE — PROGRESS NOTES
1. Have you been to the ER, urgent care clinic since your last visit? Hospitalized since your last visit? [facility] [place] 8/17. 2. Have you seen or consulted any other health care providers outside of the [facility] since your last visit? Include any pap smears or colon screening. YES PCP. C/O SWELLING IN BLE, SOB.

## 2018-09-26 ENCOUNTER — CLINICAL SUPPORT (OUTPATIENT)
Dept: CARDIOLOGY CLINIC | Age: 53
End: 2018-09-26

## 2018-09-26 DIAGNOSIS — R06.09 DOE (DYSPNEA ON EXERTION): Primary | ICD-10-CM

## 2018-09-27 ENCOUNTER — CLINICAL SUPPORT (OUTPATIENT)
Dept: CARDIOLOGY CLINIC | Age: 53
End: 2018-09-27

## 2018-09-27 DIAGNOSIS — I10 ESSENTIAL HYPERTENSION WITH GOAL BLOOD PRESSURE LESS THAN 130/80: ICD-10-CM

## 2018-09-28 ENCOUNTER — CLINICAL SUPPORT (OUTPATIENT)
Dept: CARDIOLOGY CLINIC | Age: 53
End: 2018-09-28

## 2018-09-28 DIAGNOSIS — R06.09 DOE (DYSPNEA ON EXERTION): Primary | ICD-10-CM

## 2018-10-01 NOTE — PROGRESS NOTES
Brandee: Please call patient stress test does not indicate flow-limiting blockages however he did have symptoms when I saw him for a visit. Echocardiogram was normal.  I recommend starting Ranexa 500 mg twice a day and have him follow-up in the next couple of weeks for symptom reassessment.

## 2018-10-01 NOTE — PROGRESS NOTES
Spoke to patient using 2 identifiers. Pt stated he saw PCP yesterday and she ordered an US of the abdomen and he is just waiting for a call to get it scheduled.

## 2018-10-01 NOTE — PROGRESS NOTES
Spoke to patient using 2 identifiers. Per Laverne Marquez NP, pt was made aware that stress test does not indicate flow-limiting blockages, however had symptoms at office visit. Echocardiogram was normal.  Recommends Ranex 500 mg twice daily and follow up in the next couple of weeks for reassessment of symptoms. Pt stated that he had X-rays of his back done at the [facility] on 9/21/18 which showed an incidental finding of \"anamoly in the gallstones in the RUQ of the abdomen. \"  Pt will contact the [facility] to request for Xray results to be faxed to our office. He also stated that he was told that presence of gallstones can mimic heart condition symptoms. Proceed with initial recommendation? Please advise.

## 2018-11-19 RX ORDER — FUROSEMIDE 20 MG/1
TABLET ORAL
Qty: 30 TAB | Refills: 2 | Status: SHIPPED | OUTPATIENT
Start: 2018-11-19 | End: 2019-05-08 | Stop reason: SDUPTHER

## 2018-11-21 DIAGNOSIS — I10 ESSENTIAL HYPERTENSION WITH GOAL BLOOD PRESSURE LESS THAN 130/80: ICD-10-CM

## 2019-02-20 ENCOUNTER — TELEPHONE (OUTPATIENT)
Dept: CARDIOLOGY CLINIC | Age: 54
End: 2019-02-20

## 2019-02-20 NOTE — TELEPHONE ENCOUNTER
Pt need a return call concerning medication rosubastatin is not on med list  wife bridget called she can be reached at [phone]  Pt has been having problems with this medication not feeling well please advise efrainx.

## 2019-02-21 RX ORDER — PRAVASTATIN SODIUM 10 MG/1
10 TABLET ORAL
Qty: 30 TAB | Refills: 6 | Status: SHIPPED | OUTPATIENT
Start: 2019-02-21 | End: 2019-05-16

## 2019-02-21 RX ORDER — PRAVASTATIN SODIUM 10 MG/1
TABLET ORAL
COMMUNITY
End: 2019-02-21 | Stop reason: SDUPTHER

## 2019-02-21 NOTE — TELEPHONE ENCOUNTER
Verified patient with two identifiers. Spoke with Lisseth on HIPAA, pt was on Lipitor, had muscle cramps severely PCP switched pt to Crestor, muscle cramping again. Would you like to switch to Pravastatin? Please advise.

## 2019-02-21 NOTE — TELEPHONE ENCOUNTER
Verified patient with two identifiers. Spoke with Lisseth on HIPAA, advised pt to start pravastatin 10 mg daily to see if pt can tolerate then titrate up. Will send 30 day supply to pharmacy. Advised her pt can take fish oil and coQ 10 to help with muscle cramps. She verbalized understanding. Franklin Hebert, NP   You 1 hour ago (8:49 AM)      Sure lets try pravachol 10mg and can titrate from there.

## 2019-03-22 ENCOUNTER — TELEPHONE (OUTPATIENT)
Dept: CARDIOLOGY CLINIC | Age: 54
End: 2019-03-22

## 2019-03-22 DIAGNOSIS — E78.00 HIGH CHOLESTEROL: Primary | ICD-10-CM

## 2019-03-22 NOTE — TELEPHONE ENCOUNTER
Spoke to pt's wife, Lisseth on HIPAA using 2 identifiers. Per Senthil Bassett NP, she was made aware that Zetia is another alternative for an oral medication which would improve the numbers but not the outcome. He has coronary artery disease and recommends to begin PSK 9 therapy such as Repatha. Pt's wife stated that they will look into Repatha and call the office regarding decision about Ermalene Pillion.

## 2019-03-22 NOTE — TELEPHONE ENCOUNTER
Patient's wife called believes patient should be switched to another medication other than pravastatin, having cramping.

## 2019-03-22 NOTE — TELEPHONE ENCOUNTER
Pt called, having muscle cramps after starting Pravastatin, previously on Crestor and Atorvastatin with the same reactions. What would you like to try now?

## 2019-04-19 LAB
ALBUMIN SERPL-MCNC: 4.8 G/DL (ref 3.5–5.5)
ALBUMIN/GLOB SERPL: 1.8 {RATIO} (ref 1.2–2.2)
ALP SERPL-CCNC: 41 IU/L (ref 39–117)
ALT SERPL-CCNC: 30 IU/L (ref 0–44)
AST SERPL-CCNC: 38 IU/L (ref 0–40)
BILIRUB SERPL-MCNC: 1.4 MG/DL (ref 0–1.2)
BUN SERPL-MCNC: 26 MG/DL (ref 6–24)
BUN/CREAT SERPL: 20 (ref 9–20)
CALCIUM SERPL-MCNC: 9.5 MG/DL (ref 8.7–10.2)
CHLORIDE SERPL-SCNC: 99 MMOL/L (ref 96–106)
CHOLEST SERPL-MCNC: 241 MG/DL (ref 100–199)
CK SERPL-CCNC: 688 U/L (ref 24–204)
CO2 SERPL-SCNC: 25 MMOL/L (ref 20–29)
CREAT SERPL-MCNC: 1.28 MG/DL (ref 0.76–1.27)
GLOBULIN SER CALC-MCNC: 2.6 G/DL (ref 1.5–4.5)
GLUCOSE SERPL-MCNC: 114 MG/DL (ref 65–99)
HDLC SERPL-MCNC: 34 MG/DL
INTERPRETATION, 910389: NORMAL
LDLC SERPL CALC-MCNC: 182 MG/DL (ref 0–99)
POTASSIUM SERPL-SCNC: 4.4 MMOL/L (ref 3.5–5.2)
PROT SERPL-MCNC: 7.4 G/DL (ref 6–8.5)
SODIUM SERPL-SCNC: 141 MMOL/L (ref 134–144)
TRIGL SERPL-MCNC: 123 MG/DL (ref 0–149)
VLDLC SERPL CALC-MCNC: 25 MG/DL (ref 5–40)

## 2019-04-19 NOTE — TELEPHONE ENCOUNTER
Spoke to patient using 2 identifiers. Per Jasmyne Martinez NP, patient was made aware that lab work shows he has inflamed muscles. Discontinue pravastatin. Cholesterol is very high, LDL is 182. Can discuss PSK 9 therapy with Dr. Andrey Vega next week. Pt stated he stopped taking pravastatin x 4-6 wks ago and would like to know how long that medication is rid out of your system. Please advise.

## 2019-04-19 NOTE — TELEPHONE ENCOUNTER
Brandee: Please call patient lab work shows he has inflamed muscles, discontinue pravastatin. Cholesterol is very high , he has an appointment next week with Dr. Thomson  we will discuss PSK 9 therapy at that visit.

## 2019-04-19 NOTE — TELEPHONE ENCOUNTER
Spoke to patient using 2 identifiers.   Per Sb Pierre NP, patient was made aware that it may take several weeks before the muscles return to normal.

## 2019-05-08 RX ORDER — FUROSEMIDE 20 MG/1
TABLET ORAL
Qty: 30 TAB | Refills: 2 | Status: SHIPPED | OUTPATIENT
Start: 2019-05-08

## 2019-05-16 ENCOUNTER — OFFICE VISIT (OUTPATIENT)
Dept: CARDIOLOGY CLINIC | Age: 54
End: 2019-05-16

## 2019-05-16 VITALS
OXYGEN SATURATION: 96 % | SYSTOLIC BLOOD PRESSURE: 144 MMHG | DIASTOLIC BLOOD PRESSURE: 84 MMHG | RESPIRATION RATE: 16 BRPM | BODY MASS INDEX: 38.02 KG/M2 | WEIGHT: 280.7 LBS | HEART RATE: 80 BPM | HEIGHT: 72 IN

## 2019-05-16 DIAGNOSIS — Z78.9 STATIN INTOLERANCE: ICD-10-CM

## 2019-05-16 DIAGNOSIS — Z95.5 S/P CORONARY ARTERY STENT PLACEMENT: ICD-10-CM

## 2019-05-16 DIAGNOSIS — E78.2 MIXED HYPERLIPIDEMIA: ICD-10-CM

## 2019-05-16 DIAGNOSIS — E11.9 TYPE 2 DIABETES MELLITUS WITHOUT COMPLICATION, UNSPECIFIED WHETHER LONG TERM INSULIN USE (HCC): ICD-10-CM

## 2019-05-16 DIAGNOSIS — I25.10 ASHD (ARTERIOSCLEROTIC HEART DISEASE): Primary | ICD-10-CM

## 2019-05-16 RX ORDER — TIZANIDINE 2 MG/1
2 TABLET ORAL DAILY
Refills: 2 | COMMUNITY
Start: 2019-02-20

## 2019-05-16 NOTE — PROGRESS NOTES
1. Have you been to the ER, urgent care clinic since your last visit? Hospitalized since your last visit? NO 
 
2. Have you seen or consulted any other health care providers outside of the [address] [place] [address] since your last visit. YES, PCP, RHEUMATOLOGIST. C/O SLIGHT SWELLING IN BLE OFF AND ON, SOB.

## 2019-05-16 NOTE — PROGRESS NOTES
[address], [place], [address] [facility]  [phone] Subjective:  
  
Antonio Welch is a 48 y.o. male is here for routine f/u. Did not tolerate statin, diffuse myalgia with CK elevation. Still some back pain but leg pain has resolved. Occasional left sided cp, comes and goes but doesn't last. 
 
The patient denies shortness of breath, orthopnea, PND, LE edema, palpitations, syncope, or presyncope. Patient Active Problem List  
 Diagnosis Date Noted  Type 2 diabetes mellitus without complication ([facility] [address] 69/22/8670  Abnormal nuclear cardiac imaging test 08/23/2016  S/P coronary artery stent placement 08/23/2016  Chest pain 08/09/2016  Essential hypertension with goal blood pressure less than 130/80 08/09/2016  Agatston coronary artery calcium score greater than 400 08/09/2016  High cholesterol 08/09/2016  Family history of early CAD 08/09/2016  AC (acromioclavicular) joint arthritis 11/24/2010 Bianca Martinez NP Past Medical History:  
Diagnosis Date  Abnormal nuclear cardiac imaging test 8/23/2016  Diabetes ([facility] [address]  Essential hypertension  Hyperlipidemia  S/P coronary artery stent placement 8/23/2016 8/22/16 PCI/TABATHA to LAD  Unspecified sleep apnea   
 had study and told CPAP not needed Past Surgical History:  
Procedure Laterality Date  HX ORTHOPAEDIC    
 LEFT SHOULDER 3 SURGERIES BACK 1 SURGERY Allergies Allergen Reactions  Morphine Nausea and Vomiting  Statins-Hmg-Coa Reductase Inhibitors Myalgia Family History Problem Relation Age of Onset  Diabetes Mother  Heart Disease Mother  Cancer Father  Cancer Brother Social History Socioeconomic History  Marital status:  Spouse name: Not on file  Number of children: Not on file  Years of education: Not on file  Highest education level: Not on file Occupational History  Not on file Social Needs  Financial resource strain: Not on file  Food insecurity:  
  Worry: Not on file Inability: Not on file  Transportation needs:  
  Medical: Not on file Non-medical: Not on file Tobacco Use  Smoking status: Never Smoker  Smokeless tobacco: Never Used Substance and Sexual Activity  Alcohol use: Yes Alcohol/week: 0.5 oz Types: 1 Glasses of wine per week  Drug use: No  
 Sexual activity: Not on file Lifestyle  Physical activity:  
  Days per week: Not on file Minutes per session: Not on file  Stress: Not on file Relationships  Social connections:  
  Talks on phone: Not on file Gets together: Not on file Attends Taoist service: Not on file Active member of club or organization: Not on file Attends meetings of clubs or organizations: Not on file Relationship status: Not on file  Intimate partner violence:  
  Fear of current or ex partner: Not on file Emotionally abused: Not on file Physically abused: Not on file Forced sexual activity: Not on file Other Topics Concern  Not on file Social History Narrative  Not on file Current Outpatient Medications Medication Sig  tiZANidine (ZANAFLEX) 2 mg tablet Take 2 mg by mouth daily.  evolocumab (REPATHA SURECLICK) pen injection 1 mL by SubCUTAneous route every fourteen (14) days.  furosemide (LASIX) 20 mg tablet TAKE 1 TABLET BY MOUTH EVERY DAY  traZODone (DESYREL) 150 mg tablet Take 150 mg by mouth nightly.  metFORMIN ER (GLUCOPHAGE XR) 500 mg tablet TAKE 1 TABLET BY MOUTH EVERY EVENING WITH EVENING MEAL  
 CIALIS 20 mg tablet TAKE 1/2 TABLET BY MOUTH EVERY 2-3 DAYS  losartan-hydrochlorothiazide (HYZAAR) 100-12.5 mg per tablet Take 1 Tab by mouth daily.  aspirin delayed-release 81 mg tablet Take 1 Tab by mouth daily. No current facility-administered medications for this visit. Review of Symptoms: 11 systems reviewed, negative other than as stated in the HPI Physical ExamPhysical Exam:   
Vitals:  
 05/16/19 1526 05/16/19 1543 BP: 138/72 144/84 Pulse: 80 Resp: 16 SpO2: 96% Weight: 280 lb 11.2 oz (127.3 kg) Height: 6' (1.829 m) Body mass index is 38.07 kg/m². General PE Gen:  NAD Mental Status - Alert. General Appearance - Not in acute distress. Chest and Lung Exam  
Inspection: Accessory muscles - No use of accessory muscles in breathing. Auscultation:  
Breath sounds: - Normal.  
Cardiovascular Inspection: Jugular vein - Bilateral - Inspection Normal.  
Palpation/Percussion:  
Apical Impulse: - Normal.  
Auscultation: Rhythm - Regular. Heart Sounds - S1 WNL and S2 WNL. No S3 or S4. Murmurs & Other Heart Sounds: Auscultation of the heart reveals - No Murmurs. Peripheral Vascular Upper Extremity: Inspection - Bilateral - No Cyanotic nailbeds or Digital clubbing. Lower Extremity:  
Palpation: Edema - Bilateral - No edema. Abdomen:   Soft, non-tender, bowel sounds are active. Neuro: A&O times 3, CN and motor grossly WNL Labs:  
Lab Results Component Value Date/Time Cholesterol, total 241 (H) 04/18/2019 10:51 AM  
 Cholesterol, total 134 08/23/2016 03:29 AM  
 HDL Cholesterol 34 (L) 04/18/2019 10:51 AM  
 HDL Cholesterol 37 08/23/2016 03:29 AM  
 LDL, calculated 182 (H) 04/18/2019 10:51 AM  
 LDL, calculated 65.2 08/23/2016 03:29 AM  
 Triglyceride 123 04/18/2019 10:51 AM  
 Triglyceride 159 (H) 08/23/2016 03:29 AM  
 CHOL/HDL Ratio 3.6 08/23/2016 03:29 AM  
 
No results found for: CPK, CPKX, CPX Lab Results Component Value Date/Time  Sodium 141 04/18/2019 10:51 AM  
 Potassium 4.4 04/18/2019 10:51 AM  
 Chloride 99 04/18/2019 10:51 AM  
 CO2 25 04/18/2019 10:51 AM  
 Anion gap 15 11/19/2010 02:12 PM  
 Glucose 114 (H) 04/18/2019 10:51 AM  
 BUN 26 (H) 04/18/2019 10:51 AM  
 Creatinine 1.28 (H) 04/18/2019 10:51 AM  
 BUN/Creatinine ratio 20 2019 10:51 AM  
 GFR est AA 74 2019 10:51 AM  
 GFR est non-AA 64 2019 10:51 AM  
 Calcium 9.5 2019 10:51 AM  
 Bilirubin, total 1.4 (H) 2019 10:51 AM  
 AST (SGOT) 38 2019 10:51 AM  
 Alk. phosphatase 41 2019 10:51 AM  
 Protein, total 7.4 2019 10:51 AM  
 Albumin 4.8 2019 10:51 AM  
 Globulin 3.6 2009 11:13 AM  
 A-G Ratio 1.8 2019 10:51 AM  
 ALT (SGPT) 30 2019 10:51 AM  
 
 
EKG: 
NSR Assessment: 
 
 Assessment: 1. ASHD (arteriosclerotic heart disease) 2. Mixed hyperlipidemia 3. S/P coronary artery stent placement 4. Type 2 diabetes mellitus without complication, unspecified whether long term insulin use ([facility] [address] 5. Statin intolerance Orders Placed This Encounter  LIPID PANEL Standing Status:   Future Standing Expiration Date:   2019  AMB POC EKG ROUTINE W/ 12 LEADS, INTER & REP Order Specific Question:   Reason for Exam: Answer:   ROUTINE  tiZANidine (ZANAFLEX) 2 mg tablet Sig: Take 2 mg by mouth daily. Refill:  2  
 evolocumab (REPATHA SURECLICK) pen injection Si mL by SubCUTAneous route every fourteen (14) days. Dispense:  6 Pen Refill:  3 Plan:  
 
Patient presents for 6 mos f/u. Did not tolerate statin, diffuse myalgia with CK elevation. Still some back pain but leg pain has resolved. Occasional left sided cp, comes and goes but doesn't last. 
 
Atherosclerotic heart disease:  
Cardiac cath : PTCA stenting in the LAD, he is known to have 30% lesion the RCA 40% lesion in D2 Negative NST in  Continue ASA Statin intolerance Normal EF with no significant valvular pathology per echo in  Hypertension:  
Elevated but has not taken meds Hyperlipidemia Intolerant to statin: diffuse myalgia with CK elevation 688 in , 542 in . Had tried Rosuvastatin, Atorvastatin, Pravastatin 4/19 LDL at 182. Initiate repatha therapy, will repeat labs in 3 months DM On oral agent Continue current care and f/u in 6 months. Jayde Moscoso NP [facility] 5/17/2019 Patient seen, examined by me personally. Plan discussed as detailed. Agree with note as outlined by  NP. I confirm findings in history and physical exam. No additional findings noted. Agree with plan as outlined above.   
 
Patricia Machuca MD

## 2019-05-21 ENCOUNTER — DOCUMENTATION ONLY (OUTPATIENT)
Dept: CARDIOLOGY CLINIC | Age: 54
End: 2019-05-21

## 2019-05-31 ENCOUNTER — DOCUMENTATION ONLY (OUTPATIENT)
Dept: CARDIOLOGY CLINIC | Age: 54
End: 2019-05-31

## 2019-05-31 ENCOUNTER — HOSPITAL ENCOUNTER (EMERGENCY)
Age: 54
Discharge: ANOTHER HOSPITAL | End: 2019-05-31
Attending: EMERGENCY MEDICINE
Payer: OTHER MISCELLANEOUS

## 2019-05-31 ENCOUNTER — APPOINTMENT (OUTPATIENT)
Dept: CT IMAGING | Age: 54
End: 2019-05-31
Attending: EMERGENCY MEDICINE
Payer: OTHER MISCELLANEOUS

## 2019-05-31 ENCOUNTER — APPOINTMENT (OUTPATIENT)
Dept: GENERAL RADIOLOGY | Age: 54
End: 2019-05-31
Attending: EMERGENCY MEDICINE
Payer: OTHER MISCELLANEOUS

## 2019-05-31 VITALS
HEIGHT: 72 IN | BODY MASS INDEX: 37.83 KG/M2 | SYSTOLIC BLOOD PRESSURE: 136 MMHG | OXYGEN SATURATION: 97 % | HEART RATE: 91 BPM | TEMPERATURE: 98.3 F | DIASTOLIC BLOOD PRESSURE: 96 MMHG | WEIGHT: 279.32 LBS | RESPIRATION RATE: 22 BRPM

## 2019-05-31 DIAGNOSIS — S22.41XA CLOSED FRACTURE OF MULTIPLE RIBS OF RIGHT SIDE, INITIAL ENCOUNTER: Primary | ICD-10-CM

## 2019-05-31 PROCEDURE — 96372 THER/PROPH/DIAG INJ SC/IM: CPT

## 2019-05-31 PROCEDURE — 96374 THER/PROPH/DIAG INJ IV PUSH: CPT

## 2019-05-31 PROCEDURE — 71101 X-RAY EXAM UNILAT RIBS/CHEST: CPT

## 2019-05-31 PROCEDURE — 74011250637 HC RX REV CODE- 250/637: Performed by: EMERGENCY MEDICINE

## 2019-05-31 PROCEDURE — 74011250636 HC RX REV CODE- 250/636: Performed by: EMERGENCY MEDICINE

## 2019-05-31 PROCEDURE — 99284 EMERGENCY DEPT VISIT MOD MDM: CPT

## 2019-05-31 PROCEDURE — 71250 CT THORAX DX C-: CPT

## 2019-05-31 RX ORDER — OXYCODONE AND ACETAMINOPHEN 5; 325 MG/1; MG/1
TABLET ORAL
Status: DISCONTINUED
Start: 2019-05-31 | End: 2019-05-31 | Stop reason: HOSPADM

## 2019-05-31 RX ORDER — NAPROXEN 250 MG/1
TABLET ORAL
Status: DISCONTINUED
Start: 2019-05-31 | End: 2019-05-31 | Stop reason: HOSPADM

## 2019-05-31 RX ORDER — FENTANYL CITRATE 50 UG/ML
100 INJECTION, SOLUTION INTRAMUSCULAR; INTRAVENOUS
Status: COMPLETED | OUTPATIENT
Start: 2019-05-31 | End: 2019-05-31

## 2019-05-31 RX ORDER — HYDROMORPHONE HYDROCHLORIDE 1 MG/ML
1 INJECTION, SOLUTION INTRAMUSCULAR; INTRAVENOUS; SUBCUTANEOUS
Status: COMPLETED | OUTPATIENT
Start: 2019-05-31 | End: 2019-05-31

## 2019-05-31 RX ORDER — OXYCODONE AND ACETAMINOPHEN 5; 325 MG/1; MG/1
1 TABLET ORAL
Status: COMPLETED | OUTPATIENT
Start: 2019-05-31 | End: 2019-05-31

## 2019-05-31 RX ORDER — NAPROXEN 250 MG/1
500 TABLET ORAL
Status: COMPLETED | OUTPATIENT
Start: 2019-05-31 | End: 2019-05-31

## 2019-05-31 RX ADMIN — FENTANYL CITRATE 100 MCG: 50 INJECTION, SOLUTION INTRAMUSCULAR; INTRAVENOUS at 02:47

## 2019-05-31 RX ADMIN — OXYCODONE HYDROCHLORIDE AND ACETAMINOPHEN 1 TABLET: 5; 325 TABLET ORAL at 01:17

## 2019-05-31 RX ADMIN — HYDROMORPHONE HYDROCHLORIDE 1 MG: 1 INJECTION, SOLUTION INTRAMUSCULAR; INTRAVENOUS; SUBCUTANEOUS at 03:40

## 2019-05-31 RX ADMIN — NAPROXEN 500 MG: 250 TABLET ORAL at 01:13
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
## 2019-06-19 ENCOUNTER — DOCUMENTATION ONLY (OUTPATIENT)
Dept: CARDIOLOGY CLINIC | Age: 54
End: 2019-06-19

## 2019-06-19 NOTE — PROGRESS NOTES
Faxed appeal letter to [facility] @ [phone] for [employer]

## 2019-06-28 ENCOUNTER — DOCUMENTATION ONLY (OUTPATIENT)
Dept: CARDIOLOGY CLINIC | Age: 54
End: 2019-06-28

## 2019-07-15 ENCOUNTER — TELEPHONE (OUTPATIENT)
Dept: CARDIOLOGY CLINIC | Age: 54
End: 2019-07-15

## 2019-07-15 NOTE — TELEPHONE ENCOUNTER
Patients spouse (Lisseth) calling to check status of appeal for medication    [phone].       Thanks  Arik Agrawal

## 2019-07-17 NOTE — TELEPHONE ENCOUNTER
Left message for Lisseth on HIPAA advising her pt has been denied [facility], will call her once an appeal has been approved.

## 2019-07-22 ENCOUNTER — TELEPHONE (OUTPATIENT)
Dept: CARDIOLOGY CLINIC | Age: 54
End: 2019-07-22

## 2019-07-22 NOTE — TELEPHONE ENCOUNTER
Verified patient with two identifiers. Spoke with Lisseth on HIPAA, advising her a [employer] rep called Shira was told that [employer] Payment was approved through 7/2020. Sent script through to [facility] pharmacy, advising her to  $5 discount card from office before she picks up script.   She verbalized understanding,

## 2019-07-24 ENCOUNTER — TELEPHONE (OUTPATIENT)
Dept: CARDIOLOGY CLINIC | Age: 54
End: 2019-07-24

## 2019-07-24 NOTE — TELEPHONE ENCOUNTER
Please call patients spouse Lisseth [phone].     Looking for coupons for Rep[employer]    Thanks  Ivy Richards

## 2019-08-26 DIAGNOSIS — Z95.5 S/P CORONARY ARTERY STENT PLACEMENT: ICD-10-CM

## 2019-08-26 DIAGNOSIS — E78.2 MIXED HYPERLIPIDEMIA: ICD-10-CM

## 2019-08-26 DIAGNOSIS — Z78.9 STATIN INTOLERANCE: ICD-10-CM

## 2019-08-26 DIAGNOSIS — I25.10 ASHD (ARTERIOSCLEROTIC HEART DISEASE): ICD-10-CM

## 2019-08-26 DIAGNOSIS — E11.9 TYPE 2 DIABETES MELLITUS WITHOUT COMPLICATION, UNSPECIFIED WHETHER LONG TERM INSULIN USE (HCC): ICD-10-CM

## 2020-03-31 RX ORDER — EVOLOCUMAB 140 MG/ML
140 INJECTION, SOLUTION SUBCUTANEOUS
Qty: 6 PEN | Refills: 3 | Status: SHIPPED | OUTPATIENT
Start: 2020-03-31 | End: 2020-09-14

## 2020-04-02 ENCOUNTER — DOCUMENTATION ONLY (OUTPATIENT)
Dept: CARDIOLOGY CLINIC | Age: 55
End: 2020-04-02

## 2020-05-27 ENCOUNTER — VIRTUAL VISIT (OUTPATIENT)
Dept: SLEEP MEDICINE | Age: 55
End: 2020-05-27

## 2020-05-27 VITALS — HEIGHT: 72 IN | BODY MASS INDEX: 37.79 KG/M2 | WEIGHT: 279 LBS

## 2020-05-27 DIAGNOSIS — I10 ESSENTIAL HYPERTENSION WITH GOAL BLOOD PRESSURE LESS THAN 130/80: ICD-10-CM

## 2020-05-27 DIAGNOSIS — Z95.5 S/P CORONARY ARTERY STENT PLACEMENT: ICD-10-CM

## 2020-05-27 DIAGNOSIS — G47.33 OSA (OBSTRUCTIVE SLEEP APNEA): Primary | ICD-10-CM

## 2020-05-27 NOTE — PROGRESS NOTES
[address] [place]. Vendor, [address]  Tel.  [phone]  Fax. 100 [facility] 60  [place], [address] [facility]  Tel.  [phone]  Fax. [phone] [address]  Tel.  [phone]  Fax. [phone]         Subjective:    Anish Castro is a 48 y.o. male who was seen by synchronous (real-time) audio-video technology on 5/27/2020. Consent:  He is aware that this patient-initiated Telehealth encounter is a billable service, with coverage as determined by his insurance carrier. He is aware that he may receive a bill and has provided verbal consent to proceed: Yes    I was at home while conducting this encounter. Patient verified with 's License. He complains of snoring associated with snorting, choking, periods of not breathing, tossing and turning, excessive daytime sleepiness, awakening in the middle of the night because of urination. Symptoms began several years ago, gradually worsening since that time. He usually can fall asleep in 30 minutes. Family or house members note snoring, periods of not breathing. He reports of feeling completely or partially paralyzed while falling asleep or waking up. Anish Castro does wake up frequently at night. He is not bothered by waking up too early and left unable to get back to sleep. He actually sleeps about 4 - 5 hours at night and wakes up about 10 - 15 times during the night. He does work shifts:  .   Xavier Astudillo indicates he does get too little sleep at night. His bedtime is 10:00 pm. He awakens at 5:30 am. He does take naps. He takes 20 - 25 naps a week lasting 1-2 minutes to 20 to 30 minutes. He has the following observed behaviors:  ;  .  Other remarks:  He denies of symptoms indicative of cataplexy or hypnagogic hallucinations. He reports of having frequent nightmare attributed to PTSD.  He denies of an urge to move extremities due to discomfort or other sensation but does report of dream enactment behavior. [place] Sleepiness Score: 12   and Modified F.O.S.Q. Score Total / 2: (P) 11.5   which reflect improved sleep quality over therapy time. Allergies   Allergen Reactions    Morphine Nausea and Vomiting    Statins-Hmg-Coa Reductase Inhibitors Myalgia         Current Outpatient Medications:     evolocumab (Repatha SureClick) pen injection, 1 mL by SubCUTAneous route every fourteen (14) days. , Disp: 6 Pen, Rfl: 3    tiZANidine (ZANAFLEX) 2 mg tablet, Take 2 mg by mouth daily. , Disp: , Rfl: 2    furosemide (LASIX) 20 mg tablet, TAKE 1 TABLET BY MOUTH EVERY DAY, Disp: 30 Tab, Rfl: 2    traZODone (DESYREL) 150 mg tablet, Take 150 mg by mouth nightly., Disp: , Rfl: 11    metFORMIN ER (GLUCOPHAGE XR) 500 mg tablet, TAKE 1 TABLET BY MOUTH EVERY EVENING WITH EVENING MEAL, Disp: , Rfl: 1    CIALIS 20 mg tablet, TAKE 1/2 TABLET BY MOUTH EVERY 2-3 DAYS, Disp: , Rfl: 6    losartan-hydrochlorothiazide (HYZAAR) 100-12.5 mg per tablet, Take 1 Tab by mouth daily. , Disp: , Rfl: 3    aspirin delayed-release 81 mg tablet, Take 1 Tab by mouth daily. , Disp: , Rfl:      He  has a past medical history of Abnormal nuclear cardiac imaging test (8/23/2016), Diabetes ([facility] [address] Essential hypertension, Hyperlipidemia, S/P coronary artery stent placement (8/23/2016), and Unspecified sleep apnea. He  has a past surgical history that includes hx orthopaedic. He family history includes Cancer in his brother and father; Diabetes in his mother; Heart Disease in his mother. He  reports that he has never smoked. He has never used smokeless tobacco. He reports current alcohol use of about 0.8 standard drinks of alcohol per week. He reports that he does not use drugs.      Review of Systems:  Constitutional:  No significant weight loss or weight gain  Eyes:  No blurred vision  CVS:  No significant chest pain  Pulm:  No significant shortness of breath  GI:  No significant nausea or vomiting  : significant nocturia  Musculoskeletal:  No significant joint pain at night  Skin:  No significant rashes  Neuro:  No significant dizziness   Psych:  No active mood issues    Sleep Review of Systems: notable for difficulty falling asleep; frequent awakenings at night;  regular dreaming noted;  nightmares ; early morning headaches; memory problems; concentration issues; no history of any automobile or occupational accidents due to daytime drowsiness. Objective:     Visit Vitals  Ht 6' (1.829 m)   Wt 279 lb (126.6 kg)   BMI 37.84 kg/m²          Physical Exam completed by visual and auditory observation of patient with verbal input from patient. General:   Alert, oriented, not in acute distress   Eyes:  Anicteric Sclerae; no obvious strabismus   Nose:  No obvious nasal septum deviation    Neck:   Midline trachea, no visible mass   Chest/Lungs:  Respiratory effort normal, no visualized signs of difficulty breathing or respiratory distress   CVS:  No JVD   Extremities:  No obvious rashes noted on face, neck, or hands   Neuro:  No facial asymmetry, no focal deficits; no obvious tremor    Psych:  Normal affect,  normal countenance       Assessment:       ICD-10-CM ICD-9-CM    1. DARSHANA (obstructive sleep apnea) G47.33 327.23 SLEEP STUDY UNATTENDED, 4 CHANNEL   2. S/P coronary artery stent placement Z95.5 V45.82    3. Essential hypertension with goal blood pressure less than 130/80 I10 401.9    4. BMI 37.0-37.9, adult Z68.37 V85.37          Plan:        Sleep testing was ordered for initial evaluation.  He was provided information on sleep apnea including coresponding risk factors and the importance of proper treatment.  Treatment options if indicated were reviewed today. Patient agrees to a trial of PAP therapy if indicated.  Counseling was provided regarding proper sleep hygiene, appropriate sleep schedule, need for sleep environment safety and safe driving.    Recommended a dedicated weight loss program through appropriate diet and exercise regimen as significant weight reduction has been shown to reduce severity of obstructive sleep apnea. Patient's phone number [phone] (home) was reviewed and confirmed for accuracy. He gives permission for messages regarding results and appointments to be left at that number. Pursuant to the emergency declaration under the [address] [place] [address] waiver authority and the [employer] Act, this Virtual Visit was conducted, with patient's consent, to reduce the patient's risk of exposure to COVID-19 and provide continuity of care for an established patient. Services were provided through a video synchronous discussion virtually to substitute for in-person clinic visit. Richard Casiano MD, FAASM  Electronically signed.  05/27/20

## 2020-05-27 NOTE — PATIENT INSTRUCTIONS
[address] [facility] [place]. [address] [facility], [address] Tel.  [phone] Fax. 100 [facility] 60 [place], [address] [facility] Tel.  [phone] Fax. [phone] [address] Tel.  [phone] Fax. [phone] Sleep Apnea: After Your Visit Your Care Instructions Sleep apnea occurs when you frequently stop breathing for 10 seconds or longer during sleep. It can be mild to severe, based on the number of times per hour that you stop breathing or have slowed breathing. Blocked or narrowed airways in your nose, mouth, or throat can cause sleep apnea. Your airway can become blocked when your throat muscles and tongue relax during sleep. Sleep apnea is common, occurring in 1 out of 20 individuals. Individuals having any of the following characteristics should be evaluated and treated right away due to high risk and detrimental consequences from untreated sleep apnea: 
1. Obesity 2. Congestive Heart failure 3. Atrial Fibrillation 4. Uncontrolled Hypertension 5. Type II Diabetes 6. Night-time Arrhythmias 7. Stroke 8. Pulmonary Hypertension 9. High-risk Driving Populations (pilots, truck drivers, etc.) 10. Patients Considering Weight-loss Surgery How do you know you have sleep apnea? You probably have sleep apnea if you answer 'yes' to 3 or more of the following questions: S - Have you been told that you Snore? T - Are you often Tired during the day? O - Has anyone Observed you stop breathing while sleeping? P- Do you have (or are being treated for) high blood Pressure? B - Are you obese (Body Mass Index > 35)? A - Is your Age 48years old or older? N - Is your Neck size greater than 16 inches? G - Are you male Gender? A sleep physician can prescribe a breathing device that prevents tissues in the throat from blocking your airway.  Or your doctor may recommend using a dental device (oral breathing device) to help keep your airway open. In some cases, surgery may be needed to remove enlarged tissues in the throat. Follow-up care is a key part of your treatment and safety. Be sure to make and go to all appointments, and call your doctor if you are having problems. It's also a good idea to know your test results and keep a list of the medicines you take. How can you care for yourself at home? · Lose weight, if needed. It may reduce the number of times you stop breathing or have slowed breathing. · Go to bed at the same time every night. · Sleep on your side. It may stop mild apnea. If you tend to roll onto your back, sew a pocket in the back of your pajama top. Put a tennis ball into the pocket, and stitch the pocket shut. This will help keep you from sleeping on your back. · Avoid alcohol and medicines such as sleeping pills and sedatives before bed. · Do not smoke. Smoking can make sleep apnea worse. If you need help quitting, talk to your doctor about stop-smoking programs and medicines. These can increase your chances of quitting for good. · Prop up the head of your bed 4 to 6 inches by putting bricks under the legs of the bed. · Treat breathing problems, such as a stuffy nose, caused by a cold or allergies. · Use a continuous positive airway pressure (CPAP) breathing machine if lifestyle changes do not help your apnea and your doctor recommends it. The machine keeps your airway from closing when you sleep. · If CPAP does not help you, ask your doctor whether you should try other breathing machines. A bilevel positive airway pressure machine has two types of air pressureâone for breathing in and one for breathing out. Another device raises or lowers air pressure as needed while you breathe. · If your nose feels dry or bleeds when using one of these machines, talk with your doctor about increasing moisture in the air. A humidifier may help.  
· If your nose is runny or stuffy from using a breathing machine, talk with your doctor about using decongestants or a corticosteroid nasal spray. When should you call for help? Watch closely for changes in your health, and be sure to contact your doctor if: 
· You still have sleep apnea even though you have made lifestyle changes. · You are thinking of trying a device such as CPAP. · You are having problems using a CPAP or similar machine. Where can you learn more? Go to YouTab. Enter J586 in the search box to learn more about \"Sleep Apnea: After Your Visit. \"  
© 5006-1096 Healthwise, Incorporated. Care instructions adapted under license by [facility] (which disclaims liability or warranty for this information). This care instruction is for use with your licensed healthcare professional. If you have questions about a medical condition or this instruction, always ask your healthcare professional. Ruchi Guevara any warranty or liability for your use of this information. PROPER SLEEP HYGIENE What to avoid · Do not have drinks with caffeine, such as coffee or black tea, for 8 hours before bed. · Do not smoke or use other types of tobacco near bedtime. Nicotine is a stimulant and can keep you awake. · Avoid drinking alcohol late in the evening, because it can cause you to wake in the middle of the night. · Do not eat a big meal close to bedtime. If you are hungry, eat a light snack. · Do not drink a lot of water close to bedtime, because the need to urinate may wake you up during the night. · Do not read or watch TV in bed. Use the bed only for sleeping and sexual activity. What to try · Go to bed at the same time every night, and wake up at the same time every morning. Do not take naps during the day. · Keep your bedroom quiet, dark, and cool. · Get regular exercise, but not within 3 to 4 hours of your bedtime. [place] Stands · Sleep on a comfortable pillow and mattress. · If watching the clock makes you anxious, turn it facing away from you so you cannot see the time. · If you worry when you lie down, start a worry book. Well before bedtime, write down your worries, and then set the book and your concerns aside. · Try meditation or other relaxation techniques before you go to bed. · If you cannot fall asleep, get up and go to another room until you feel sleepy. Do something relaxing. Repeat your bedtime routine before you go to bed again. · Make your house quiet and calm about an hour before bedtime. Turn down the lights, turn off the TV, log off the computer, and turn down the volume on music. This can help you relax after a busy day. Drowsy Driving The [place] [address] cites drowsiness as a causing factor in more than 343,488 police reported crashes annually, resulting in 76,000 injuries and 1,500 deaths. Other surveys suggest 55% of people polled have driven while drowsy in the past year, 23% had fallen asleep but not crashed, 3% crashed, and 2% had and accident due to drowsy driving. Who is at risk? Young Drivers: One study of drowsy driving accidents states that 55% of the drivers were under 25 years. Of those, 75% were male. Shift Workers and Travelers: People who work overnight or travel across time zones frequently are at higher risk of experiencing Circadian Rhythm Disorders. They are trying to work and function when their body is programed to sleep. Sleep Deprived: Lack of sleep has a serious impact on your ability to pay attention or focus on a task. Consistently getting less than the average of 8 hours your body needs creates partial or cumulative sleep deprivation. Untreated Sleep Disorders: Sleep Apnea, Narcolepsy, R.L.S., and other sleep disorders (untreated) prevent a person from getting enough restful sleep.  This leads to excessive daytime sleepiness and increases the risk for drowsy driving accidents by up to 7 times. Medications / Alcohol: Even over the counter medications can cause drowsiness. Medications that impair a drivers attention should have a warning label. Alcohol naturally makes you sleepy and on its own can cause accidents. Combined with excessive drowsiness its effects are amplified. Signs of Drowsy Driving: * You don't remember driving the last few miles * You may drift out of your juancarlos * You are unable to focus and your thoughts wander * You may yawn more often than normal 
 * You have difficulty keeping your eyes open / nodding off * Missing traffic signs, speeding, or tailgating Prevention-  
Good sleep hygiene, lifestyle and behavioral choices have the most impact on drowsy driving. There is no substitute for sleep and the average person requires 8 hours nightly. If you find yourself driving drowsy, stop and sleep. Consider the sleep hygiene tips provided during your visit as well. Medication Refill Policy: Refills for all medications require 1 week advance notice. Please have your pharmacy fax a refill request. We are unable to fax, or call in \"controled substance\" medications and you will need to pick these prescriptions up from our office. [employer] Activation Thank you for requesting access to [employer]. Please follow the instructions below to securely access and download your online medical record. [employer] allows you to send messages to your doctor, view your test results, renew your prescriptions, schedule appointments, and more. How Do I Sign Up? 1. In your internet browser, go to [email or web address]. "[email or web address]. 2. Click on the First Time User? Click Here link in the Sign In box. You will see the New Member Sign Up page. 3. Enter your [employer] Access Code exactly as it appears below. You will not need to use this code after youve completed the sign-up process.  If you do not sign up before the expiration date, you must request a new code. [employer] Access Code: Activation code not generated Current [employer] Status: Active (This is the date your [employer] access code will ) 4. Enter the last four digits of your Social Security Number (xxxx) and Date of Birth (mm/dd/yyyy) as indicated and click Submit. You will be taken to the next sign-up page. 5. Create a [employer] ID. This will be your [employer] login ID and cannot be changed, so think of one that is secure and easy to remember. 6. Create a [employer] password. You can change your password at any time. 7. Enter your Password Reset Question and Answer. This can be used at a later time if you forget your password. 8. Enter your e-mail address. You will receive e-mail notification when new information is available in [address] 9. Click Sign Up. You can now view and download portions of your medical record. 10. Click the Download Summary menu link to download a portable copy of your medical information. Additional Information If you have questions, please call [phone]. Remember, [employer] is NOT to be used for urgent needs. For medical emergencies, dial 911.

## 2020-06-12 ENCOUNTER — PATIENT MESSAGE (OUTPATIENT)
Dept: SLEEP MEDICINE | Age: 55
End: 2020-06-12

## 2020-06-25 ENCOUNTER — TELEPHONE (OUTPATIENT)
Dept: SLEEP MEDICINE | Age: 55
End: 2020-06-25

## 2020-06-25 DIAGNOSIS — G47.33 OSA (OBSTRUCTIVE SLEEP APNEA): Primary | ICD-10-CM

## 2020-06-25 NOTE — TELEPHONE ENCOUNTER
Results of Sleep Testing reviewed with patient who agrees to a trial of APAP therapy. PAP prescription and follow-up discussed with patient. Patient encouraged to call if there were any further questions regarding sleep symptoms. Encounter Diagnosis   Name Primary?  DARSHANA (obstructive sleep apnea) Yes       Orders Placed This Encounter    AMB SUPPLY ORDER     Diagnosis: (G47.33) DARSHANA (obstructive sleep apnea)  (primary encounter diagnosis)     [employer]ics DreamStation ( Unit - Auto Mode) / Heated Humidifier :    Positive Airway Pressure Therapy: Duration of need: 99 months. Auto - PAP: 4 - 20 cmH2O; Optistart enabled. Ramp Time: 30 Minutes; Flex: 2. Remote monitoring enrollment.  Nasal Cushion (Replace) 2 per month.  Nasal Interface Mask 1 every 3 months.  Headgear 1 every 6 months.  Filter(s) Disposable 2 per month.  Filter(s) Non-Disposable 1 every 6 months. [address] [facility] [address] for [employer] (Replace) 1 every 6 months.  Tubing with heating element 1 every 3 months. Perform Mask Fitting per patient preference and comfort - replace as above. Jose Phillips MD, FAASM; NPI: [id number]  Electronically signed. 06/25/20

## 2020-06-26 ENCOUNTER — TELEPHONE (OUTPATIENT)
Dept: SLEEP MEDICINE | Age: 55
End: 2020-06-26

## 2020-07-09 ENCOUNTER — HOSPITAL ENCOUNTER (OUTPATIENT)
Dept: CT IMAGING | Age: 55
Discharge: HOME | End: 2020-07-09
Attending: PHYSICIAN ASSISTANT
Payer: COMMERCIAL

## 2020-07-09 DIAGNOSIS — R10.84 ABDOMINAL PAIN, GENERALIZED: ICD-10-CM

## 2020-07-09 DIAGNOSIS — K57.30 DIVERTICULOSIS OF LARGE INTESTINE WITHOUT DIVERTICULITIS: ICD-10-CM

## 2020-07-09 DIAGNOSIS — R19.7 DIARRHEA OF PRESUMED INFECTIOUS ORIGIN: ICD-10-CM

## 2020-07-09 PROCEDURE — 74011000255 HC RX REV CODE- 255: Performed by: PHYSICIAN ASSISTANT

## 2020-07-09 PROCEDURE — 74011636320 HC RX REV CODE- 636/320: Performed by: PHYSICIAN ASSISTANT

## 2020-07-09 PROCEDURE — 82565 ASSAY OF CREATININE: CPT

## 2020-07-09 PROCEDURE — 74177 CT ABD & PELVIS W/CONTRAST: CPT

## 2020-07-09 RX ORDER — BARIUM SULFATE 20 MG/ML
900 SUSPENSION ORAL
Status: DISCONTINUED | OUTPATIENT
Start: 2020-07-09 | End: 2020-07-09

## 2020-07-09 RX ORDER — BARIUM SULFATE 20 MG/ML
900 SUSPENSION ORAL
Status: COMPLETED | OUTPATIENT
Start: 2020-07-09 | End: 2020-07-09

## 2020-07-09 RX ORDER — SODIUM CHLORIDE 0.9 % (FLUSH) 0.9 %
10 SYRINGE (ML) INJECTION
Status: COMPLETED | OUTPATIENT
Start: 2020-07-09 | End: 2020-07-09

## 2020-07-09 RX ADMIN — BARIUM SULFATE 900 ML: 21 SUSPENSION ORAL at 12:53

## 2020-07-09 RX ADMIN — Medication 10 ML: at 12:53

## 2020-07-09 RX ADMIN — IOPAMIDOL 100 ML: 755 INJECTION, SOLUTION INTRAVENOUS at 12:53

## 2020-07-10 LAB — CREAT BLD-MCNC: 1.2 MG/DL (ref 0.6–1.3)

## 2020-12-24 ENCOUNTER — TELEPHONE (OUTPATIENT)
Dept: CARDIOLOGY CLINIC | Age: 55
End: 2020-12-24

## 2020-12-24 DIAGNOSIS — E78.00 HIGH CHOLESTEROL: Primary | ICD-10-CM

## 2020-12-24 NOTE — TELEPHONE ENCOUNTER
12-24-20- Patient needs lab work in order to complete paper work for Abdoulaye Russ as well as per Dr. Sandee Hart note. Patient instructed to have lab work completed. He stated that he will have it done on Monday. Patient instructed to fast but can have water. 12-30-20- Lab work completed per Yanna Pope. NP the patent will need to come in for an appointment before submitting paperwork. Left v/m for the patient and notified the .

## 2020-12-29 LAB
ALBUMIN SERPL-MCNC: 4.6 G/DL (ref 3.8–4.9)
ALBUMIN/GLOB SERPL: 2 {RATIO} (ref 1.2–2.2)
ALP SERPL-CCNC: 40 IU/L (ref 39–117)
ALT SERPL-CCNC: 33 IU/L (ref 0–44)
AST SERPL-CCNC: 20 IU/L (ref 0–40)
BILIRUB SERPL-MCNC: 1.4 MG/DL (ref 0–1.2)
BUN SERPL-MCNC: 23 MG/DL (ref 6–24)
BUN/CREAT SERPL: 18 (ref 9–20)
CALCIUM SERPL-MCNC: 9.6 MG/DL (ref 8.7–10.2)
CHLORIDE SERPL-SCNC: 102 MMOL/L (ref 96–106)
CHOLEST SERPL-MCNC: 143 MG/DL (ref 100–199)
CK SERPL-CCNC: 85 U/L (ref 41–331)
CO2 SERPL-SCNC: 24 MMOL/L (ref 20–29)
CREAT SERPL-MCNC: 1.25 MG/DL (ref 0.76–1.27)
GLOBULIN SER CALC-MCNC: 2.3 G/DL (ref 1.5–4.5)
GLUCOSE SERPL-MCNC: 115 MG/DL (ref 65–99)
HDLC SERPL-MCNC: 53 MG/DL
INTERPRETATION, 910389: NORMAL
LDLC SERPL CALC-MCNC: 62 MG/DL (ref 0–99)
POTASSIUM SERPL-SCNC: 4.6 MMOL/L (ref 3.5–5.2)
PROT SERPL-MCNC: 6.9 G/DL (ref 6–8.5)
SODIUM SERPL-SCNC: 142 MMOL/L (ref 134–144)
TRIGL SERPL-MCNC: 167 MG/DL (ref 0–149)
VLDLC SERPL CALC-MCNC: 28 MG/DL (ref 5–40)

## 2020-12-31 ENCOUNTER — OFFICE VISIT (OUTPATIENT)
Dept: CARDIOLOGY CLINIC | Age: 55
End: 2020-12-31
Payer: COMMERCIAL

## 2020-12-31 VITALS
BODY MASS INDEX: 36.3 KG/M2 | WEIGHT: 268 LBS | HEART RATE: 56 BPM | HEIGHT: 72 IN | SYSTOLIC BLOOD PRESSURE: 136 MMHG | OXYGEN SATURATION: 96 % | RESPIRATION RATE: 18 BRPM | DIASTOLIC BLOOD PRESSURE: 90 MMHG

## 2020-12-31 DIAGNOSIS — I25.10 ASHD (ARTERIOSCLEROTIC HEART DISEASE): Primary | ICD-10-CM

## 2020-12-31 PROCEDURE — 93000 ELECTROCARDIOGRAM COMPLETE: CPT | Performed by: INTERNAL MEDICINE

## 2020-12-31 PROCEDURE — 99213 OFFICE O/P EST LOW 20 MIN: CPT | Performed by: INTERNAL MEDICINE

## 2020-12-31 RX ORDER — EMPAGLIFLOZIN, METFORMIN HYDROCHLORIDE 12.5; 1 MG/1; MG/1
TABLET, EXTENDED RELEASE ORAL DAILY
COMMUNITY
Start: 2020-10-22

## 2020-12-31 RX ORDER — LANOLIN ALCOHOL/MO/W.PET/CERES
1000 CREAM (GRAM) TOPICAL DAILY
COMMUNITY
End: 2020-12-31 | Stop reason: ALTCHOICE

## 2020-12-31 RX ORDER — CYANOCOBALAMIN 1000 UG/ML
INJECTION, SOLUTION INTRAMUSCULAR; SUBCUTANEOUS
COMMUNITY
Start: 2020-02-03

## 2020-12-31 RX ORDER — TESTOSTERONE ENANTHATE 100 MG/.5ML
INJECTION SUBCUTANEOUS
COMMUNITY
Start: 2020-11-14

## 2020-12-31 RX ORDER — BACLOFEN 10 MG/1
TABLET ORAL
COMMUNITY
Start: 2020-11-23

## 2020-12-31 RX ORDER — GABAPENTIN 300 MG/1
300 CAPSULE ORAL 2 TIMES DAILY
COMMUNITY
Start: 2020-11-09

## 2020-12-31 NOTE — PROGRESS NOTES
1. Have you been to the ER, urgent care clinic since your last visit? Hospitalized since your last visit? No    2. Have you seen or consulted any other health care providers outside of the [address] [place] [address] since your last visit? Include any pap smears or colon screening.  No

## 2020-12-31 NOTE — PROGRESS NOTES
Subjective/HPI:     Gina Frias is a 50 y.o. male is here for routine f/u. He has a PMHx of ASHD, HTN, hyperlipidemia. Has no complaints. Doing well. PCP Provider  Delores España NP    Past Medical History:   Diagnosis Date    Abnormal nuclear cardiac imaging test 8/23/2016    CAD (coronary artery disease)     Congestive heart failure (HCC)     Diabetes ([address]     Essential hypertension     Hyperlipidemia     S/P coronary artery stent placement 8/23/2016 8/22/16 PCI/TABATHA to LAD    Unspecified sleep apnea     had study and told CPAP not needed        Allergies   Allergen Reactions    Morphine Nausea and Vomiting    Statins-Hmg-Coa Reductase Inhibitors Myalgia        Outpatient Encounter Medications as of 12/31/2020   Medication Sig Dispense Refill    baclofen (LIORESAL) 10 mg tablet TAKE 1 TABLET BY MOUTH TWICE A DAY      Synjardy XR 12.5-1,000 mg TBph daily.  gabapentin (NEURONTIN) 300 mg capsule Take 300 mg by mouth two (2) times a day.  Xyosted 100 mg/0.5 mL atIn INJECT 100 MG SUBCUTANEOUSLY ONCE A WEEK      cyanocobalamin (VITAMIN B12) 1,000 mcg/mL injection INJECT 1 ML SUBCUTANEOUSLY EVERY 10 DAYS      Repatha SureClick pen injection INJECT 1 ML UNDER THE SKIN EVERY 14 DAYS 6 Pen 0    tiZANidine (ZANAFLEX) 2 mg tablet Take 2 mg by mouth daily. 2    furosemide (LASIX) 20 mg tablet TAKE 1 TABLET BY MOUTH EVERY DAY 30 Tab 2    traZODone (DESYREL) 150 mg tablet Take 150 mg by mouth nightly. 11    CIALIS 20 mg tablet TAKE 1/2 TABLET BY MOUTH EVERY 2-3 DAYS  6    losartan-hydrochlorothiazide (HYZAAR) 100-12.5 mg per tablet Take 1 Tab by mouth daily. 3    aspirin delayed-release 81 mg tablet Take 1 Tab by mouth daily.  [DISCONTINUED] cyanocobalamin (Vitamin B-12) 1,000 mcg tablet Take 1,000 mcg by mouth daily.       metFORMIN ER (GLUCOPHAGE XR) 500 mg tablet TAKE 1 TABLET BY MOUTH EVERY EVENING WITH EVENING MEAL  1     No facility-administered encounter medications on file as of 12/31/2020. Review of Symptoms:    ROS    Physical Exam:      General: Well developed, in no acute distress, cooperative and alert  HEENT: No carotid bruits, no JVD, trach is midline. Neck Supple, PEERL, EOM intact. Heart:  reg rate and rhythm; normal S1/S2; no murmurs, no gallops or rubs. Respiratory: Clear bilaterally x 4, no wheezing or rales  Abdomen:   Soft, non-tender, no distention, no masses. + BS. Extremities:  Normal cap refill, no cyanosis, atraumatic. No edema. Neuro: A&Ox3, speech clear, gait stable. Skin: Skin color is normal. No rashes or lesions. Non diaphoretic  Vascular: 2+ pulses symmetric in all extremities    Visit Vitals  BP (!) 136/90 (BP 1 Location: Left arm, BP Patient Position: Sitting)   Pulse 62   Resp 18   Ht 6' (1.829 m)   Wt 268 lb (121.6 kg)   SpO2 96%   BMI 36.35 kg/m²       ECG: sinus rythm    Cardiology Labs:    Lab Results   Component Value Date/Time    Cholesterol, total 143 12/28/2020 09:59 AM    HDL Cholesterol 53 12/28/2020 09:59 AM    LDL, calculated 62 12/28/2020 09:59 AM    LDL, calculated 182 (H) 04/18/2019 10:51 AM    LDL, calculated 65.2 08/23/2016 03:29 AM    VLDL, calculated 28 12/28/2020 09:59 AM    VLDL, calculated 25 04/18/2019 10:51 AM    CHOL/HDL Ratio 3.6 08/23/2016 03:29 AM       No results found for: HBA1C, [id number], AGO3FZPR    Lab Results   Component Value Date/Time    Sodium 142 12/28/2020 09:59 AM    Potassium 4.6 12/28/2020 09:59 AM    Chloride 102 12/28/2020 09:59 AM    CO2 24 12/28/2020 09:59 AM    Glucose 115 (H) 12/28/2020 09:59 AM    BUN 23 12/28/2020 09:59 AM    Creatinine 1.25 12/28/2020 09:59 AM    BUN/Creatinine ratio 18 12/28/2020 09:59 AM    GFR est AA 76 12/28/2020 09:59 AM    GFR est non-AA 65 12/28/2020 09:59 AM    Calcium 9.6 12/28/2020 09:59 AM    Anion gap 15 11/19/2010 02:12 PM    Bilirubin, total 1.4 (H) 12/28/2020 09:59 AM    ALT (SGPT) 33 12/28/2020 09:59 AM    Alk.  phosphatase 40 12/28/2020 09:59 AM    Protein, total 6.9 12/28/2020 09:59 AM    Albumin 4.6 12/28/2020 09:59 AM    Globulin 3.6 03/27/2009 11:13 AM    A-G Ratio 2.0 12/28/2020 09:59 AM          Assessment:       ICD-10-CM ICD-9-CM    1. ASHD (arteriosclerotic heart disease)  I25.10 414.00 AMB POC EKG ROUTINE W/ 12 LEADS, INTER & REP        Plan:   Atherosclerotic heart disease:   Cardiac cath 8/16: PTCA stenting in the LAD, he is known to have 30% lesion the RCA 40% lesion in D2  Negative NST in 9/18  Continue ASA  Statin intolerance. On repatha.     Normal EF with no significant valvular pathology per echo in 9/18     Hypertension:   Elevated but has not taken meds     Hyperlipidemia  Intolerant to statin: diffuse myalgia with CK elevation 688 in 4/19, 542 in 1/19. Had tried Rosuvastatin, Atorvastatin, Pravastatin  On repatha, LDL at goal.     DM  On oral agent    Doing well. F/u in one year.     Virgen Branham MD

## 2020-12-31 NOTE — LETTER
12/31/2020 Patient: Yani Mack YOB: 1965 Date of Visit: 12/31/2020 Crystal Stevenson NP 
[address] [address] Via Fax: [phone] Dear Crystal Stevenson NP, Thank you for referring Mr. Liliana Browne to [address] [place] [address] for evaluation. My notes for this consultation are attached. If you have questions, please do not hesitate to call me. I look forward to following your patient along with you. Sincerely, Virgen Branham MD

## 2021-01-06 ENCOUNTER — TELEPHONE (OUTPATIENT)
Dept: CARDIOLOGY CLINIC | Age: 56
End: 2021-01-06

## 2021-01-06 NOTE — TELEPHONE ENCOUNTER
Patient might have problems getting refill on medication because it requires prior auth.       Sowmya    Patient is out of medication and they     Jayde Catherine [phone]    Patient now has [email or web address] Ã¨â€¹Â±Ã¨â€¦Â¾Ã§Â½â€˜ and they are required to use [employer]    Thanks   Isak Queen

## 2021-01-06 NOTE — TELEPHONE ENCOUNTER
Patient's insurance changed to [employer]. Called [facility], [phone]. Per [employer] Archie Player will need a prior authorization and that they will forward paperwork via fax to be completed.

## 2021-01-08 NOTE — TELEPHONE ENCOUNTER
Patient calling to check the status of Repatha prior auth with Jaden Reddy [phone].       Thanks  Rhea Begum

## 2021-01-08 NOTE — TELEPHONE ENCOUNTER
Patient calling again has the paperwork at least been filled out and sent to [facility]     Please call     Thanks  Temi Frzaier

## 2021-01-12 NOTE — TELEPHONE ENCOUNTER
Have we made any head way with this medication [facility]    [phone]    Thanks  Vega Huynh

## 2021-01-13 NOTE — TELEPHONE ENCOUNTER
s/w Lisseth Hernandez. Lisseth stated that she spoke with her insurance and that they should be receiving the medication tomorrow. She stated that she will call back if she needs anything further.

## 2021-01-21 ENCOUNTER — TELEPHONE (OUTPATIENT)
Dept: CARDIOLOGY CLINIC | Age: 56
End: 2021-01-21

## 2021-01-21 NOTE — TELEPHONE ENCOUNTER
----- Message from Virginie Esteban MD sent at 1/21/2021 12:03 PM EST -----  Lipids look great. Continue same.

## 2021-02-14 ENCOUNTER — HOSPITAL ENCOUNTER (EMERGENCY)
Age: 56
Discharge: HOME | End: 2021-02-14
Attending: EMERGENCY MEDICINE
Payer: COMMERCIAL

## 2021-02-14 VITALS
TEMPERATURE: 98.1 F | SYSTOLIC BLOOD PRESSURE: 113 MMHG | BODY MASS INDEX: 35.35 KG/M2 | HEIGHT: 72 IN | OXYGEN SATURATION: 97 % | DIASTOLIC BLOOD PRESSURE: 97 MMHG | HEART RATE: 73 BPM | RESPIRATION RATE: 18 BRPM | WEIGHT: 261.02 LBS

## 2021-02-14 DIAGNOSIS — L02.31 ABSCESS OF RIGHT BUTTOCK: Primary | ICD-10-CM

## 2021-02-14 LAB
ALBUMIN SERPL-MCNC: 3.6 G/DL (ref 3.5–5)
ALBUMIN/GLOB SERPL: 0.8 {RATIO} (ref 1.1–2.2)
ALP SERPL-CCNC: 46 U/L (ref 45–117)
ALT SERPL-CCNC: 28 U/L (ref 12–78)
ANION GAP SERPL CALC-SCNC: 7 MMOL/L (ref 5–15)
AST SERPL-CCNC: 15 U/L (ref 15–37)
BASOPHILS # BLD: 0.1 K/UL (ref 0–0.1)
BASOPHILS NFR BLD: 1 % (ref 0–1)
BILIRUB SERPL-MCNC: 2 MG/DL (ref 0.2–1)
BUN SERPL-MCNC: 20 MG/DL (ref 6–20)
BUN/CREAT SERPL: 15 (ref 12–20)
CALCIUM SERPL-MCNC: 9.2 MG/DL (ref 8.5–10.1)
CHLORIDE SERPL-SCNC: 105 MMOL/L (ref 97–108)
CO2 SERPL-SCNC: 26 MMOL/L (ref 21–32)
CREAT SERPL-MCNC: 1.3 MG/DL (ref 0.7–1.3)
DIFFERENTIAL METHOD BLD: ABNORMAL
EOSINOPHIL # BLD: 0.4 K/UL (ref 0–0.4)
EOSINOPHIL NFR BLD: 3 % (ref 0–7)
ERYTHROCYTE [DISTWIDTH] IN BLOOD BY AUTOMATED COUNT: 13 % (ref 11.5–14.5)
GLOBULIN SER CALC-MCNC: 4.5 G/DL (ref 2–4)
GLUCOSE SERPL-MCNC: 101 MG/DL (ref 65–100)
HCT VFR BLD AUTO: 46.6 % (ref 36.6–50.3)
HGB BLD-MCNC: 15.6 G/DL (ref 12.1–17)
IMM GRANULOCYTES # BLD AUTO: 0 K/UL (ref 0–0.04)
IMM GRANULOCYTES NFR BLD AUTO: 0 % (ref 0–0.5)
LYMPHOCYTES # BLD: 2.8 K/UL (ref 0.8–3.5)
LYMPHOCYTES NFR BLD: 23 % (ref 12–49)
MCH RBC QN AUTO: 31.8 PG (ref 26–34)
MCHC RBC AUTO-ENTMCNC: 33.5 G/DL (ref 30–36.5)
MCV RBC AUTO: 95.1 FL (ref 80–99)
MONOCYTES # BLD: 1.1 K/UL (ref 0–1)
MONOCYTES NFR BLD: 9 % (ref 5–13)
NEUTS SEG # BLD: 7.7 K/UL (ref 1.8–8)
NEUTS SEG NFR BLD: 64 % (ref 32–75)
NRBC # BLD: 0 K/UL (ref 0–0.01)
NRBC BLD-RTO: 0 PER 100 WBC
PLATELET # BLD AUTO: 200 K/UL (ref 150–400)
PMV BLD AUTO: 9.2 FL (ref 8.9–12.9)
POTASSIUM SERPL-SCNC: 3.7 MMOL/L (ref 3.5–5.1)
PROT SERPL-MCNC: 8.1 G/DL (ref 6.4–8.2)
RBC # BLD AUTO: 4.9 M/UL (ref 4.1–5.7)
SODIUM SERPL-SCNC: 138 MMOL/L (ref 136–145)
WBC # BLD AUTO: 12.1 K/UL (ref 4.1–11.1)

## 2021-02-14 PROCEDURE — 74011250636 HC RX REV CODE- 250/636: Performed by: EMERGENCY MEDICINE

## 2021-02-14 PROCEDURE — 74011250637 HC RX REV CODE- 250/637: Performed by: EMERGENCY MEDICINE

## 2021-02-14 PROCEDURE — 80053 COMPREHEN METABOLIC PANEL: CPT

## 2021-02-14 PROCEDURE — 85025 COMPLETE CBC W/AUTO DIFF WBC: CPT

## 2021-02-14 PROCEDURE — 36415 COLL VENOUS BLD VENIPUNCTURE: CPT

## 2021-02-14 PROCEDURE — 74011000258 HC RX REV CODE- 258: Performed by: EMERGENCY MEDICINE

## 2021-02-14 PROCEDURE — 96375 TX/PRO/DX INJ NEW DRUG ADDON: CPT

## 2021-02-14 PROCEDURE — 87185 SC STD ENZYME DETCJ PER NZM: CPT

## 2021-02-14 PROCEDURE — 87077 CULTURE AEROBIC IDENTIFY: CPT

## 2021-02-14 PROCEDURE — 75810000289 HC I&D ABSCESS SIMP/COMP/MULT

## 2021-02-14 PROCEDURE — 87205 SMEAR GRAM STAIN: CPT

## 2021-02-14 PROCEDURE — 96374 THER/PROPH/DIAG INJ IV PUSH: CPT

## 2021-02-14 PROCEDURE — 87186 SC STD MICRODIL/AGAR DIL: CPT

## 2021-02-14 PROCEDURE — 96365 THER/PROPH/DIAG IV INF INIT: CPT

## 2021-02-14 PROCEDURE — 99283 EMERGENCY DEPT VISIT LOW MDM: CPT

## 2021-02-14 PROCEDURE — 96372 THER/PROPH/DIAG INJ SC/IM: CPT

## 2021-02-14 RX ORDER — OXYCODONE AND ACETAMINOPHEN 5; 325 MG/1; MG/1
1 TABLET ORAL
Qty: 15 TAB | Refills: 0 | Status: SHIPPED | OUTPATIENT
Start: 2021-02-14 | End: 2021-02-17

## 2021-02-14 RX ORDER — SULFAMETHOXAZOLE AND TRIMETHOPRIM 800; 160 MG/1; MG/1
1 TABLET ORAL 2 TIMES DAILY
Qty: 20 TAB | Refills: 0 | Status: SHIPPED | OUTPATIENT
Start: 2021-02-14 | End: 2021-02-24

## 2021-02-14 RX ORDER — IBUPROFEN 600 MG/1
600 TABLET ORAL
Qty: 20 TAB | Refills: 0 | Status: SHIPPED | OUTPATIENT
Start: 2021-02-14

## 2021-02-14 RX ORDER — OXYCODONE AND ACETAMINOPHEN 5; 325 MG/1; MG/1
2 TABLET ORAL
Status: COMPLETED | OUTPATIENT
Start: 2021-02-14 | End: 2021-02-14

## 2021-02-14 RX ORDER — FENTANYL CITRATE 50 UG/ML
100 INJECTION, SOLUTION INTRAMUSCULAR; INTRAVENOUS
Status: COMPLETED | OUTPATIENT
Start: 2021-02-14 | End: 2021-02-14

## 2021-02-14 RX ADMIN — FENTANYL CITRATE 100 MCG: 50 INJECTION, SOLUTION INTRAMUSCULAR; INTRAVENOUS at 07:28

## 2021-02-14 RX ADMIN — OXYCODONE AND ACETAMINOPHEN 2 TABLET: 5; 325 TABLET ORAL at 08:44

## 2021-02-14 RX ADMIN — SODIUM CHLORIDE 3 G: 900 INJECTION INTRAVENOUS at 07:28

## 2021-02-14 NOTE — DISCHARGE INSTRUCTIONS
It was a pleasure taking care of you in our Emergency Department today. We know that when you come to [place] [address] you are entrusting us with your health, comfort, and safety. Our physicians and nurses honor that trust, and truly appreciate the opportunity to care for you and your loved ones. We also value your feedback. If you receive a survey about your Emergency Department experience today, please fill it out. We care about our patients' feedback, and we listen to what you have to say. Thank you! Your buttock abscess was drained today and you were treated with intravenous antibiotics. We recommend that you continue taking Bactrim antibiotic twice daily for the next 10 days. Your packing will need to be changed every other day for the next week or so until the pocket is becoming smaller. You can treat the wound like you would any other cut, exposing it to soap and water and patting dry. Return to the emergency department if you are having worsening pain or swelling or fevers suggesting that the infection is worsening.

## 2021-02-14 NOTE — ED PROVIDER NOTES
EMERGENCY DEPARTMENT HISTORY AND PHYSICAL EXAM      Date: 2/14/2021  Patient Name: Milton Graham  Patient Age and Sex: 50 y.o. male    History of Presenting Illness     Chief Complaint   Patient presents with    Abscess     Right buttocks        History Provided By: Patient and Patient's Wife    Ability to gather history was limited by:     HPI: Milton Graham, 50 y.o. male with history of diabetes, hypertension, complains of right buttock abscess for the past 1 week, gradually worsening. He feels a firm painful swollen lump at the base of the scrotum and into the right buttock. Pain is described as severe, burning in nature, 8 out of 10 severity. Worsened by sitting or touching the swollen area. No fevers. Location:    Quality:      Severity:    Duration:   Timing:      Context:    Modifying factors:   Associated symptoms:       The patient's medical, surgical, family, and social history on file were reviewed by me today.       Past Medical History:   Diagnosis Date    Abnormal nuclear cardiac imaging test 8/23/2016    CAD (coronary artery disease)     Congestive heart failure (HCC)     Diabetes ([address]     Essential hypertension     Hyperlipidemia     S/P coronary artery stent placement 8/23/2016 8/22/16 PCI/TABATHA to LAD    Unspecified sleep apnea     had study and told CPAP not needed     Past Surgical History:   Procedure Laterality Date    HX ORTHOPAEDIC      LEFT SHOULDER 3 SURGERIES BACK 1 SURGERY       PCP: Idania Heller NP    Past History     Past Medical History:  Past Medical History:   Diagnosis Date    Abnormal nuclear cardiac imaging test 8/23/2016    CAD (coronary artery disease)     Congestive heart failure (HCC)     Diabetes ([address]     Essential hypertension     Hyperlipidemia     S/P coronary artery stent placement 8/23/2016 8/22/16 PCI/TABATHA to LAD    Unspecified sleep apnea     had study and told CPAP not needed       Past Surgical History:  Past Surgical History: Procedure Laterality Date    HX ORTHOPAEDIC      LEFT SHOULDER 3 SURGERIES BACK 1 SURGERY       Family History:  Family History   Problem Relation Age of Onset    Diabetes Mother     Heart Disease Mother     Cancer Father     Cancer Brother        Social History:  Social History     Tobacco Use    Smoking status: Never Smoker    Smokeless tobacco: Never Used   Substance Use Topics    Alcohol use: Yes     Alcohol/week: 0.8 standard drinks     Types: 1 Glasses of wine per week     Comment: daily    Drug use: No       Allergies: Allergies   Allergen Reactions    Morphine Nausea and Vomiting    Statins-Hmg-Coa Reductase Inhibitors Myalgia       Current Medications:  No current facility-administered medications on file prior to encounter. Current Outpatient Medications on File Prior to Encounter   Medication Sig Dispense Refill    baclofen (LIORESAL) 10 mg tablet TAKE 1 TABLET BY MOUTH TWICE A DAY      Synjardy XR 12.5-1,000 mg TBph daily.  gabapentin (NEURONTIN) 300 mg capsule Take 300 mg by mouth two (2) times a day.  Xyosted 100 mg/0.5 mL atIn INJECT 100 MG SUBCUTANEOUSLY ONCE A WEEK      cyanocobalamin (VITAMIN B12) 1,000 mcg/mL injection INJECT 1 ML SUBCUTANEOUSLY EVERY 10 DAYS      Repatha SureClick pen injection INJECT 1 ML UNDER THE SKIN EVERY 14 DAYS 6 Pen 0    tiZANidine (ZANAFLEX) 2 mg tablet Take 2 mg by mouth daily. 2    furosemide (LASIX) 20 mg tablet TAKE 1 TABLET BY MOUTH EVERY DAY 30 Tab 2    traZODone (DESYREL) 150 mg tablet Take 150 mg by mouth nightly. 11    metFORMIN ER (GLUCOPHAGE XR) 500 mg tablet TAKE 1 TABLET BY MOUTH EVERY EVENING WITH EVENING MEAL  1    CIALIS 20 mg tablet TAKE 1/2 TABLET BY MOUTH EVERY 2-3 DAYS  6    losartan-hydrochlorothiazide (HYZAAR) 100-12.5 mg per tablet Take 1 Tab by mouth daily. 3    aspirin delayed-release 81 mg tablet Take 1 Tab by mouth daily.          Review of Systems   Review of Systems   Constitutional: Negative for fatigue and fever. Respiratory: Negative for shortness of breath. Cardiovascular: Negative for chest pain. Gastrointestinal: Negative for abdominal pain. All other systems reviewed and are negative. Physical Exam   Vital Signs  Patient Vitals for the past 8 hrs:   Temp Pulse Resp BP SpO2   02/14/21 0625 98.1 °F (36.7 °C) 73 18 (!) 140/80 97 %          Physical Exam  Vitals signs and nursing note reviewed. Constitutional:       General: He is not in acute distress. Appearance: Normal appearance. He is well-developed. He is not ill-appearing. HENT:      Head: Normocephalic and atraumatic. Eyes:      General:         Right eye: No discharge. Left eye: No discharge. Conjunctiva/sclera: Conjunctivae normal.   Neck:      Musculoskeletal: Normal range of motion and neck supple. Cardiovascular:      Rate and Rhythm: Normal rate and regular rhythm. Heart sounds: Normal heart sounds. No murmur. Pulmonary:      Effort: Pulmonary effort is normal. No respiratory distress. Breath sounds: Normal breath sounds. No wheezing. Abdominal:      General: There is no distension. Palpations: Abdomen is soft. Tenderness: There is no abdominal tenderness. Musculoskeletal: Normal range of motion. General: No deformity. Skin:     General: Skin is warm and dry. Findings: Abscess present. No rash. Comments: Large fluctuant abscess, at least 5 cm, with area of induration extending to base of scrotum   Neurological:      General: No focal deficit present. Mental Status: He is alert and oriented to person, place, and time. Psychiatric:         Mood and Affect: Mood normal.         Behavior: Behavior normal.         Thought Content:  Thought content normal.         Diagnostic Study Results   Labs  Recent Results (from the past 24 hour(s))   CBC WITH AUTOMATED DIFF    Collection Time: 02/14/21  7:02 AM   Result Value Ref Range    WBC 12.1 (H) 4.1 - 11.1 K/uL    RBC 4.90 4. 10 - 5.70 M/uL    HGB 15.6 12.1 - 17.0 g/dL    HCT 46.6 36.6 - 50.3 %    MCV 95.1 80.0 - 99.0 FL    MCH 31.8 26.0 - 34.0 PG    MCHC 33.5 30.0 - 36.5 g/dL    RDW 13.0 11.5 - 14.5 %    PLATELET 822 370 - 582 K/uL    MPV 9.2 8.9 - 12.9 FL    NRBC 0.0 0  WBC    ABSOLUTE NRBC 0.00 0.00 - 0.01 K/uL    NEUTROPHILS 64 32 - 75 %    LYMPHOCYTES 23 12 - 49 %    MONOCYTES 9 5 - 13 %    EOSINOPHILS 3 0 - 7 %    BASOPHILS 1 0 - 1 %    IMMATURE GRANULOCYTES 0 0.0 - 0.5 %    ABS. NEUTROPHILS 7.7 1.8 - 8.0 K/UL    ABS. LYMPHOCYTES 2.8 0.8 - 3.5 K/UL    ABS. MONOCYTES 1.1 (H) 0.0 - 1.0 K/UL    ABS. EOSINOPHILS 0.4 0.0 - 0.4 K/UL    ABS. BASOPHILS 0.1 0.0 - 0.1 K/UL    ABS. IMM. GRANS. 0.0 0.00 - 0.04 K/UL    DF AUTOMATED     METABOLIC PANEL, COMPREHENSIVE    Collection Time: 02/14/21  7:02 AM   Result Value Ref Range    Sodium 138 136 - 145 mmol/L    Potassium 3.7 3.5 - 5.1 mmol/L    Chloride 105 97 - 108 mmol/L    CO2 26 21 - 32 mmol/L    Anion gap 7 5 - 15 mmol/L    Glucose 101 (H) 65 - 100 mg/dL    BUN 20 6 - 20 MG/DL    Creatinine 1.30 0.70 - 1.30 MG/DL    BUN/Creatinine ratio 15 12 - 20      GFR est AA >60 >60 ml/min/1.73m2    GFR est non-AA 58 (L) >60 ml/min/1.73m2    Calcium 9.2 8.5 - 10.1 MG/DL    Bilirubin, total 2.0 (H) 0.2 - 1.0 MG/DL    ALT (SGPT) 28 12 - 78 U/L    AST (SGOT) 15 15 - 37 U/L    Alk.  phosphatase 46 45 - 117 U/L    Protein, total 8.1 6.4 - 8.2 g/dL    Albumin 3.6 3.5 - 5.0 g/dL    Globulin 4.5 (H) 2.0 - 4.0 g/dL    A-G Ratio 0.8 (L) 1.1 - 2.2         Radiologic Studies  No orders to display     CT Results  (Last 48 hours)    None        CXR Results  (Last 48 hours)    None          Billable Procedures   I&D Abcess Complex    Date/Time: 2/14/2021 8:50 AM  Performed by: Mian Fuentes MD  Authorized by: Mian Fuentes MD     Consent:     Consent obtained:  Verbal    Consent given by:  Patient    Risks discussed:  Bleeding, incomplete drainage and pain  Location: Type:  Abscess    Size:  5 cm    Location:  Anogenital    Anogenital location:  Gluteal cleft  Anesthesia (see MAR for exact dosages): Anesthesia method:  Local infiltration    Local anesthetic:  Lidocaine 1% WITH epi  Procedure type:     Complexity:  Complex  Procedure details:     Needle aspiration: no      Incision types:  Single straight    Incision depth:  Subcutaneous    Scalpel blade:  11    Wound management:  Probed and deloculated    Drainage:  Purulent    Drainage amount:  Copious    Wound treatment:  Wound left open    Packing materials:  1/4 in iodoform gauze    Amount 1/4\" iodoform:  16 cm  Post-procedure details:     Patient tolerance of procedure: Tolerated well, no immediate complications        Cardiac monitoring was not ordered for this patient. Medical Decision Making     I reviewed the patient's most recent Emergency Dept notes and diagnostic tests  in formulating my MDM on today's visit. Provider Notes (Medical Decision Making):   49 yo M with large right buttock abscess x5 days, no fevers or abd pain. Laboratories reassuring. No imaging appears to be indicated. Patient gave verbal consent for bedside I&D. Copious large amount of pus was evacuated from the wound. Wound was probed and deloculated with a Q-tip and wound culture was sent. Packed with 16 cm iodoform gauze. Patient was administered Ancef antibiotic intravenously. Stable for discharge home on Bactrim and Percocet for pain, will need to return to the emergency department for wound repacking, and/or home wound care for packing. Douglas Lopez MD  8:40 AM    Consults:    Social History     Tobacco Use    Smoking status: Never Smoker    Smokeless tobacco: Never Used   Substance Use Topics    Alcohol use:  Yes     Alcohol/week: 0.8 standard drinks     Types: 1 Glasses of wine per week     Comment: daily    Drug use: No     Patient Vitals for the past 4 hrs:   Temp Pulse Resp BP SpO2   02/14/21 0625 98.1 °F (36.7 °C) 73 18 (!) 140/80 97 %          Prescriptions from today's ED visit:  Current Discharge Medication List      START taking these medications    Details   trimethoprim-sulfamethoxazole (Bactrim DS) 160-800 mg per tablet Take 1 Tab by mouth two (2) times a day for 10 days. Qty: 20 Tab, Refills: 0      oxyCODONE-acetaminophen (Percocet) 5-325 mg per tablet Take 1 Tab by mouth every six (6) hours as needed for Pain for up to 3 days. Max Daily Amount: 4 Tabs. Qty: 15 Tab, Refills: 0    Associated Diagnoses: Abscess of right buttock      ibuprofen (MOTRIN) 600 mg tablet Take 1 Tab by mouth every six (6) hours as needed for Pain. Qty: 20 Tab, Refills: 0              Medications Administered during ED course:  Medications   oxyCODONE-acetaminophen (PERCOCET) 5-325 mg per tablet 2 Tab (has no administration in time range)   ampicillin-sulbactam (UNASYN) 3 g in 0.9% sodium chloride (MBP/ADV) 100 mL MBP (3 g IntraVENous New Bag 2/14/21 0728)   fentaNYL citrate (PF) injection 100 mcg (100 mcg IntraVENous Given 2/14/21 0728)          Diagnosis and Disposition     Disposition:  Discharged    Clinical Impression:   1. Abscess of right buttock        Attestation:  I personally performed the services described in this documentation on this date 2/14/2021 for patient Michelle Almanza. Mahamed Cedillo MD        I was the first provider for this patient on this visit. To the best of my ability I reviewed relevant prior medical records, electrocardiograms, laboratories, and radiologic studies. The patient's presenting problems were discussed, and the patient was in agreement with the care plan formulated and outlined with them. Mahamed Cedillo MD    Please note that this dictation was completed with Dragon voice recognition software. Quite often unanticipated grammatical, syntax, homophones, and other interpretive errors are inadvertently transcribed by the computer software.  Please disregard these errors and excuse any errors that have escaped final proofreading.

## 2021-02-14 NOTE — ED NOTES
Bedside shift change report given to 2[address] Nathaniel Tapia (oncoming nurse) by Ana Cristina Haas (offgoing nurse). Report included the following information SBAR, ED Summary, MAR and Recent Results.

## 2021-02-14 NOTE — PROGRESS NOTES
10:00 AM  CM has also submitted referrals to [facility], [facility], [facility], [facility]. TAE has heard back from [facility] and [facility] that they are not in network with pt's [employer]. Awaiting other referral determinations on [employer]. CM has updated MD. When accepting company is known, CM will contact pt. Original note:  TAE informed by attending MD that pt will need home health wound care needs at d/c. TAE has submitted referrals to [facility] and [facility] for evaluation, awaiting determination on referral status. Will update MD when known.     Maliha Christine [address], [address]

## 2021-02-17 LAB
BACTERIA SPEC CULT: ABNORMAL
BACTERIA SPEC CULT: ABNORMAL
GRAM STN SPEC: ABNORMAL
GRAM STN SPEC: ABNORMAL
SERVICE CMNT-IMP: ABNORMAL

## 2021-03-26 RX ORDER — EVOLOCUMAB 140 MG/ML
INJECTION, SOLUTION SUBCUTANEOUS
Qty: 6 PEN | Refills: 1 | Status: SHIPPED | OUTPATIENT
Start: 2021-03-26 | End: 2021-07-12

## 2021-04-05 ENCOUNTER — TELEPHONE (OUTPATIENT)
Dept: CARDIOLOGY CLINIC | Age: 56
End: 2021-04-05

## 2021-04-05 NOTE — TELEPHONE ENCOUNTER
Spoke with SIERRA with [employer] reference # [id number] for prior auth information for repatha they will be faxing review notification.

## 2021-04-05 NOTE — TELEPHONE ENCOUNTER
Please call Yi at [facility] regarding prior [facility] for [address] [place] [address]. [phone] option 4. Please have them rickie as urgent so it will be expedited.     Thanks,  Dolabhinav Honger

## 2021-04-05 NOTE — TELEPHONE ENCOUNTER
Rosy SANDERS request to [facility], fax # [phone]. Faxed form, LDL numbers, OV. Repatha PA pending.

## 2021-07-12 RX ORDER — EVOLOCUMAB 140 MG/ML
INJECTION, SOLUTION SUBCUTANEOUS
Qty: 6 PEN | Refills: 1 | Status: SHIPPED | OUTPATIENT
Start: 2021-07-12 | End: 2022-02-28

## 2021-09-22 ENCOUNTER — TRANSCRIBE ORDER (OUTPATIENT)
Dept: SCHEDULING | Age: 56
End: 2021-09-22

## 2021-09-30 ENCOUNTER — HOSPITAL ENCOUNTER (OUTPATIENT)
Dept: GENERAL RADIOLOGY | Age: 56
Discharge: HOME | End: 2021-09-30
Payer: OTHER GOVERNMENT

## 2021-09-30 ENCOUNTER — TRANSCRIBE ORDER (OUTPATIENT)
Dept: REGISTRATION | Age: 56
End: 2021-09-30

## 2021-09-30 DIAGNOSIS — R06.02 SHORTNESS OF BREATH: Primary | ICD-10-CM

## 2021-09-30 DIAGNOSIS — R06.02 SHORTNESS OF BREATH: ICD-10-CM

## 2021-09-30 PROCEDURE — 71046 X-RAY EXAM CHEST 2 VIEWS: CPT

## 2021-10-04 ENCOUNTER — HOSPITAL ENCOUNTER (OUTPATIENT)
Dept: WOUND CARE | Age: 56
Discharge: HOME | End: 2021-10-04
Payer: COMMERCIAL

## 2021-10-04 VITALS
TEMPERATURE: 97.8 F | SYSTOLIC BLOOD PRESSURE: 134 MMHG | RESPIRATION RATE: 18 BRPM | HEART RATE: 64 BPM | DIASTOLIC BLOOD PRESSURE: 90 MMHG

## 2021-10-04 DIAGNOSIS — L98.492 ULCER OF PERIANAL AREA WITH FAT LAYER EXPOSED (HCC): ICD-10-CM

## 2021-10-04 PROCEDURE — 99202 OFFICE O/P NEW SF 15 MIN: CPT | Performed by: SURGERY

## 2021-10-04 PROCEDURE — 99212 OFFICE O/P EST SF 10 MIN: CPT

## 2021-10-04 NOTE — WOUND CARE
10/04/21 0840   Wound Anus Right 10/04/21   Date First Assessed/Time First Assessed: 10/04/21 0839   Wound Approximate Age at First Assessment (Weeks): 22 weeks  Primary Wound Type: Abscess  Location: Anus  Wound Location Orientation: Right  Date of First Observation: 10/04/21   Wound Image    Wound Etiology   (abscess)   Dressing Status   (open to air)   Cleansed Cleansed with saline   Wound Length (cm) 0.2 cm   Wound Width (cm) 0.3 cm   Wound Depth (cm) 0.1 cm   Wound Surface Area (cm^2) 0.06 cm^2   Wound Volume (cm^3) 0.006 cm^3   Wound Assessment Pink/red   Drainage Amount Small   Drainage Description Serosanguinous   Wound Odor None   Amber-Wound/Incision Assessment Intact   Edges Flat/open edges   Wound Thickness Description Full thickness     Visit Vitals  BP (!) 134/90   Pulse 64   Temp 97.8 °F (36.6 °C)   Resp 18

## 2021-10-04 NOTE — DISCHARGE INSTRUCTIONS
Discharge Instructions/Wound Orders  [address], [address] [place] [address] Regina Kan [id number]  Telephone: [phone]    NAME:  Eugenia Frey  YOB: 1965  MEDICAL RECORD NUMBER:  [id number]  DATE:  10/4/2021  Wound Care Orders:    Keep wound clean, soap and water. Refer to colorectal surgeon r/t possible fistula. No need to return to wound center unless problems arise. .  Dietary:  [] Diet as tolerated: [] Calorie Diabetic Diet:Low carb and no Sugar [] No Added Salt:[] Increase Protein: [] Other:Limit the amount of liquid you are drinking and avoid drinking in between meals   Activity:  [] Activity as tolerated:  [] Patient has no activity restrictions     [] Strict Bedrest: [] Remain off Work:     [] May return to full duty work:                                   [] Return to work with restrictions:             Return Appointment:   [x] Return Appointment:[] Ordered tests:    Electronically signed Yevgeniy Lopez RN on 10/4/2021 at 9:00 AM     Maliha Win 281: Should you experience any significant changes in your wound(s) or have questions about your wound care, please contact the [address] [place] [address] at [address] [place] [address] 8:00 am - 4:30. If you need help with your wound outside these hours and cannot wait until we are again available, contact your PCP or go to the hospital emergency room. PLEASE NOTE: IF YOU ARE UNABLE TO OBTAIN WOUND SUPPLIES, CONTINUE TO USE THE SUPPLIES YOU HAVE AVAILABLE UNTIL YOU ARE ABLE TO REACH US. IT IS MOST IMPORTANT TO KEEP THE WOUND COVERED AT ALL TIMES.      Physician Signature:_______________________    Date: ___________ Time:  ____________

## 2021-10-21 ENCOUNTER — TRANSCRIBE ORDER (OUTPATIENT)
Dept: SCHEDULING | Age: 56
End: 2021-10-21

## 2021-10-21 DIAGNOSIS — K61.2 ABSCESS OF ANAL AND RECTAL REGIONS: Primary | ICD-10-CM

## 2021-11-12 ENCOUNTER — HOSPITAL ENCOUNTER (OUTPATIENT)
Dept: MRI IMAGING | Age: 56
Discharge: HOME | End: 2021-11-12
Attending: COLON & RECTAL SURGERY
Payer: COMMERCIAL

## 2021-11-12 DIAGNOSIS — K61.2 ABSCESS OF ANAL AND RECTAL REGIONS: ICD-10-CM

## 2021-11-12 PROCEDURE — 74011250636 HC RX REV CODE- 250/636: Performed by: COLON & RECTAL SURGERY

## 2021-11-12 PROCEDURE — A9576 INJ PROHANCE MULTIPACK: HCPCS | Performed by: COLON & RECTAL SURGERY

## 2021-11-12 PROCEDURE — 72197 MRI PELVIS W/O & W/DYE: CPT

## 2021-11-12 RX ADMIN — GADOTERIDOL 20 ML: 279.3 INJECTION, SOLUTION INTRAVENOUS at 10:33

## 2021-12-15 ENCOUNTER — TELEPHONE (OUTPATIENT)
Dept: CARDIOLOGY CLINIC | Age: 56
End: 2021-12-15

## 2021-12-15 NOTE — TELEPHONE ENCOUNTER
Fax received from [facility] pharmacy requesting prior auth. Prior authorization for Repatha was submitted on [employer]. Key [id number].

## 2021-12-21 ENCOUNTER — TELEPHONE (OUTPATIENT)
Dept: CARDIOLOGY CLINIC | Age: 56
End: 2021-12-21

## 2021-12-21 NOTE — TELEPHONE ENCOUNTER
Request for Repatha has been approved by [employer]. Coverage starts 12- and ends on 12-. Case ID [id number].  [facility] was called and informed of approval.

## 2021-12-28 ENCOUNTER — OFFICE VISIT (OUTPATIENT)
Dept: CARDIOLOGY CLINIC | Age: 56
End: 2021-12-28

## 2021-12-28 VITALS
WEIGHT: 266.8 LBS | HEART RATE: 62 BPM | DIASTOLIC BLOOD PRESSURE: 88 MMHG | BODY MASS INDEX: 36.14 KG/M2 | SYSTOLIC BLOOD PRESSURE: 128 MMHG | RESPIRATION RATE: 18 BRPM | OXYGEN SATURATION: 96 % | HEIGHT: 72 IN

## 2021-12-28 DIAGNOSIS — I25.10 CORONARY ARTERY DISEASE INVOLVING NATIVE CORONARY ARTERY OF NATIVE HEART WITHOUT ANGINA PECTORIS: Primary | ICD-10-CM

## 2021-12-28 DIAGNOSIS — I10 PRIMARY HYPERTENSION: ICD-10-CM

## 2021-12-28 DIAGNOSIS — E78.00 HIGH CHOLESTEROL: ICD-10-CM

## 2021-12-28 PROCEDURE — 99214 OFFICE O/P EST MOD 30 MIN: CPT | Performed by: NURSE PRACTITIONER

## 2021-12-28 PROCEDURE — 93000 ELECTROCARDIOGRAM COMPLETE: CPT | Performed by: NURSE PRACTITIONER

## 2021-12-28 RX ORDER — LIDOCAINE 40 MG/G
CREAM TOPICAL
COMMUNITY
Start: 2021-05-28 | End: 2021-12-28 | Stop reason: ALTCHOICE

## 2021-12-28 RX ORDER — LIDOCAINE 50 MG/G
OINTMENT TOPICAL
COMMUNITY
Start: 2021-06-17 | End: 2022-06-18

## 2021-12-28 RX ORDER — DICLOFENAC SODIUM 75 MG/1
75 TABLET, DELAYED RELEASE ORAL AS NEEDED
COMMUNITY
Start: 2021-11-24

## 2021-12-28 NOTE — PROGRESS NOTES
Kaitlynn Marie, FNP-BC    Subjective/HPI:     Taras Jones is a 49 y.o. male is here for routine f/u. He has a PMHx of CAD, DM2, HTN and HLD. He feels well. Has been struggling with anal fissure, requiring treatment. Has not been exercising regularly due to this. Is looking forward to spending New Year's with his son who is stationed at [employer]. Denies complaints of chest pains, dizziness, orthopnea, PND or edema. Denies palpitation symptoms or shortness of breath. Current Outpatient Medications on File Prior to Visit   Medication Sig Dispense Refill    lidocaine (XYLOCAINE) 5 % ointment APPLY SMALL AMOUNT TO AFFECTED AREA THREE TIMES A DAY AS NEEDED FOR PAIN      diclofenac EC (VOLTAREN) 75 mg EC tablet Take 75 mg by mouth as needed.  semaglutide (Rybelsus) 14 mg tablet Take 14 mg by mouth Daily (before breakfast).  evolocumab (Repatha SureClick) pen injection INJECT 1ML UNDER THE SKIN EVERY 14 DAYS 6 Pen 1    ibuprofen (MOTRIN) 600 mg tablet Take 1 Tab by mouth every six (6) hours as needed for Pain. 20 Tab 0    baclofen (LIORESAL) 10 mg tablet TAKE 1 TABLET BY MOUTH TWICE A DAY      Synjardy XR 12.5-1,000 mg TBph daily.  gabapentin (NEURONTIN) 300 mg capsule Take 300 mg by mouth two (2) times a day.  Xyosted 100 mg/0.5 mL atIn INJECT 100 MG SUBCUTANEOUSLY ONCE A WEEK      cyanocobalamin (VITAMIN B12) 1,000 mcg/mL injection INJECT 1 ML SUBCUTANEOUSLY EVERY 10 DAYS      tiZANidine (ZANAFLEX) 2 mg tablet Take 2 mg by mouth daily. 2    furosemide (LASIX) 20 mg tablet TAKE 1 TABLET BY MOUTH EVERY DAY 30 Tab 2    traZODone (DESYREL) 150 mg tablet Take 150 mg by mouth nightly. 11    CIALIS 20 mg tablet TAKE 1/2 TABLET BY MOUTH EVERY 2-3 DAYS  6    losartan-hydrochlorothiazide (HYZAAR) 100-12.5 mg per tablet Take 1 Tab by mouth daily. 3    aspirin delayed-release 81 mg tablet Take 1 Tab by mouth daily.       [DISCONTINUED] lidocaine-transparent dressing (LMX 4 PLUS) 4 % dressing        No current facility-administered medications on file prior to visit. Review of Symptoms:    Review of Systems   Constitutional: Negative for chills, fever and weight loss. HENT: Negative for nosebleeds. Eyes: Negative for blurred vision and double vision. Respiratory: Negative for cough, shortness of breath and wheezing. Cardiovascular: Negative for chest pain, palpitations, orthopnea, leg swelling and PND. Skin: Negative for rash. Neurological: Negative for dizziness and loss of consciousness. Physical Exam:      General: Well developed, in no acute distress, cooperative and alert  Heart:  reg rate and rhythm; normal S1/S2; no murmurs, no gallops or rubs. Respiratory: Clear bilaterally x 4, no wheezing or rales  Extremities:  Normal cap refill, no cyanosis, atraumatic. No edema. Vascular: 2+ pulses symmetric in all extremities    Vitals:    12/28/21 0902   BP: 128/88   BP 1 Location: Right arm   BP Patient Position: Sitting   BP Cuff Size: Large adult   Pulse: 62   Resp: 18   Height: 6' (1.829 m)   Weight: 266 lb 12.8 oz (121 kg)   SpO2: 96%       ECG done today shows sinus rhythm     Assessment:       ICD-10-CM ICD-9-CM    1. Coronary artery disease involving native coronary artery of native heart without angina pectoris  I25.10 414.01    2. High cholesterol  E78.00 272.0    3. Primary hypertension  I10 401.9 AMB POC EKG ROUTINE W/ 12 LEADS, INTER & REP        Plan:     1. Coronary artery disease involving native coronary artery of native heart without angina pectoris  Hx of TABATHA to LAD in 8/2016; has 30% RCA and 40% D2 stenosis, medically managed  Lexiscan stress test done 9/2018 without evidence of ischemia  Echo done 9/2018 with preserved LVEF 55-60%  Without anginal or anginal equivalent symptoms  Continue ASA, BB. Intolerant to statins; on Repatha    2.  High cholesterol  On Repatha  Repeat lipids for this year  Previously intolerant to statins due to myalgias with elevated CK    3. Primary hypertension  BP controlled.   Continue anti-hypertensive therapy and low sodium diet    F/u with Dr. Jami Reyes in 1 year    Teddy Keys NP

## 2021-12-28 NOTE — PROGRESS NOTES
1. Have you been to the ER, urgent care clinic since your last visit? Hospitalized since your last visit? 2- ED [facility] ER abscess      2. Have you seen or consulted any other health care providers outside of the [address] [place] [address] since your last visit? Include any pap smears or colon screening. No  Chief Complaint   Patient presents with    Hypertension     Yearly appt.    C/O HTN.     /

## 2022-01-17 ENCOUNTER — TELEPHONE (OUTPATIENT)
Dept: CARDIOLOGY CLINIC | Age: 57
End: 2022-01-17

## 2022-01-17 NOTE — TELEPHONE ENCOUNTER
Spoke with patient. Verified patient with two patient identifiers. Advised pt of overdue labs. Pt states he will get them done next week. Patient verbalized understanding.

## 2022-02-04 ENCOUNTER — TELEPHONE (OUTPATIENT)
Dept: CARDIOLOGY CLINIC | Age: 57
End: 2022-02-04

## 2022-02-28 RX ORDER — EVOLOCUMAB 140 MG/ML
INJECTION, SOLUTION SUBCUTANEOUS
Qty: 6 PEN | Refills: 1 | Status: SHIPPED | OUTPATIENT
Start: 2022-02-28

## 2022-03-02 ENCOUNTER — TELEPHONE (OUTPATIENT)
Dept: CARDIOLOGY CLINIC | Age: 57
End: 2022-03-02

## 2022-03-02 NOTE — TELEPHONE ENCOUNTER
Pt wife Prakash Rudi is returning your call. Pt wife will be  unavailable this afternoon she wanted to know if you can leave a detail message.     [address] [place]

## 2022-03-02 NOTE — TELEPHONE ENCOUNTER
Fax received from [facility] pharmacy requesting a new PA for  Repatha. This writer called Pharmacy and explained that [address] [place] [address] was approved with until Dec 21, 2022 with ID [id number]. Pharmacy couldn't see PA. This writer spoke [employer]  representative who confirmed [address] [place] [address] was approved until Dec 21, 2021.

## 2022-03-10 ENCOUNTER — TELEPHONE (OUTPATIENT)
Dept: CARDIOLOGY CLINIC | Age: 57
End: 2022-03-10

## 2022-03-10 NOTE — TELEPHONE ENCOUNTER
Received a fax from [employer] [facility] requesting a PA for [address] [place] [address]. [address] [place] [address] was approved until 12- ID [id number]. This writer called patient unsuccessfully.  anibal on voice mail to call office back at [phone]

## 2022-03-10 NOTE — TELEPHONE ENCOUNTER
Verified Patient with two identifiers. Nurse spoke with patient whom stated he got everything okay with Kristina Card. Wife called [facility] pharmacy last week for clarification. No PA needed at this time. No concerns voiced at [facility] time.

## 2022-03-18 PROBLEM — E11.9 TYPE 2 DIABETES MELLITUS WITHOUT COMPLICATION (HCC): Status: ACTIVE | Noted: 2017-05-10

## 2022-03-19 PROBLEM — I25.10 CORONARY ARTERY DISEASE INVOLVING NATIVE CORONARY ARTERY OF NATIVE HEART WITHOUT ANGINA PECTORIS: Status: ACTIVE | Noted: 2021-12-28

## 2022-03-20 PROBLEM — L98.492 ULCER OF PERIANAL AREA WITH FAT LAYER EXPOSED (HCC): Status: ACTIVE | Noted: 2021-10-04

## 2024-12-02 ENCOUNTER — TRANSCRIBE ORDERS (OUTPATIENT)
Facility: HOSPITAL | Age: 59
End: 2024-12-02

## 2024-12-02 DIAGNOSIS — M50.30 DISC DISEASE, DEGENERATIVE, CERVICAL: ICD-10-CM

## 2024-12-02 DIAGNOSIS — M54.2 NECK PAIN: ICD-10-CM

## 2024-12-02 DIAGNOSIS — M54.31 RIGHT SIDED SCIATICA: ICD-10-CM

## 2024-12-02 DIAGNOSIS — Z98.890 HISTORY OF LAMINECTOMY: ICD-10-CM

## 2024-12-02 DIAGNOSIS — M51.369 DEGENERATION OF INTERVERTEBRAL DISC OF LUMBAR REGION, UNSPECIFIED WHETHER PAIN PRESENT: ICD-10-CM

## 2024-12-02 DIAGNOSIS — M48.02 CERVICAL SPINAL STENOSIS: ICD-10-CM

## 2024-12-02 DIAGNOSIS — M48.02 FORAMINAL STENOSIS OF CERVICAL REGION: ICD-10-CM

## 2024-12-02 DIAGNOSIS — M54.32 LEFT SIDED SCIATICA: ICD-10-CM

## 2024-12-02 DIAGNOSIS — M54.50 LUMBAR PAIN: Primary | ICD-10-CM

## 2024-12-02 DIAGNOSIS — M47.816 LUMBAR SPONDYLOSIS: ICD-10-CM

## 2024-12-02 DIAGNOSIS — M47.812 CERVICAL SPONDYLOSIS WITHOUT MYELOPATHY: ICD-10-CM

## 2024-12-02 DIAGNOSIS — M54.16 RADICULOPATHY, LUMBAR REGION: ICD-10-CM

## 2024-12-19 ENCOUNTER — HOSPITAL ENCOUNTER (OUTPATIENT)
Facility: HOSPITAL | Age: 59
Discharge: HOME | End: 2024-12-22
Attending: ORTHOPAEDIC SURGERY
Payer: COMMERCIAL

## 2024-12-19 DIAGNOSIS — M54.16 RADICULOPATHY, LUMBAR REGION: ICD-10-CM

## 2024-12-19 DIAGNOSIS — M54.32 LEFT SIDED SCIATICA: ICD-10-CM

## 2024-12-19 DIAGNOSIS — Z98.890 HISTORY OF LAMINECTOMY: ICD-10-CM

## 2024-12-19 DIAGNOSIS — M54.31 RIGHT SIDED SCIATICA: ICD-10-CM

## 2024-12-19 DIAGNOSIS — M47.812 CERVICAL SPONDYLOSIS WITHOUT MYELOPATHY: ICD-10-CM

## 2024-12-19 DIAGNOSIS — M51.369 DEGENERATION OF INTERVERTEBRAL DISC OF LUMBAR REGION, UNSPECIFIED WHETHER PAIN PRESENT: ICD-10-CM

## 2024-12-19 DIAGNOSIS — M48.02 CERVICAL SPINAL STENOSIS: ICD-10-CM

## 2024-12-19 DIAGNOSIS — M54.50 LUMBAR PAIN: ICD-10-CM

## 2024-12-19 DIAGNOSIS — M50.30 DISC DISEASE, DEGENERATIVE, CERVICAL: ICD-10-CM

## 2024-12-19 DIAGNOSIS — M54.2 NECK PAIN: ICD-10-CM

## 2024-12-19 DIAGNOSIS — M48.02 FORAMINAL STENOSIS OF CERVICAL REGION: ICD-10-CM

## 2024-12-19 DIAGNOSIS — M47.816 LUMBAR SPONDYLOSIS: ICD-10-CM

## 2024-12-19 PROCEDURE — 72141 MRI NECK SPINE W/O DYE: CPT

## 2025-02-03 ENCOUNTER — HOSPITAL ENCOUNTER (OUTPATIENT)
Facility: HOSPITAL | Age: 60
Discharge: HOME | End: 2025-02-06
Payer: COMMERCIAL

## 2025-02-03 VITALS
DIASTOLIC BLOOD PRESSURE: 90 MMHG | HEIGHT: 72 IN | BODY MASS INDEX: 34.73 KG/M2 | TEMPERATURE: 98.4 F | SYSTOLIC BLOOD PRESSURE: 147 MMHG | WEIGHT: 256.39 LBS | HEART RATE: 56 BPM | OXYGEN SATURATION: 96 % | RESPIRATION RATE: 20 BRPM

## 2025-02-03 LAB
ABO + RH BLD: NORMAL
ALBUMIN SERPL-MCNC: 3.9 G/DL (ref 3.5–5)
ALBUMIN/GLOB SERPL: 1.1 (ref 1.1–2.2)
ALP SERPL-CCNC: 54 U/L (ref 45–117)
ALT SERPL-CCNC: 35 U/L (ref 12–78)
AMPHET UR QL SCN: NEGATIVE
ANION GAP SERPL CALC-SCNC: 5 MMOL/L (ref 2–12)
APPEARANCE UR: CLEAR
AST SERPL-CCNC: 24 U/L (ref 15–37)
BACTERIA URNS QL MICRO: NEGATIVE /HPF
BARBITURATES UR QL SCN: NEGATIVE
BENZODIAZ UR QL: NEGATIVE
BILIRUB SERPL-MCNC: 1 MG/DL (ref 0.2–1)
BILIRUB UR QL: NEGATIVE
BLOOD GROUP ANTIBODIES SERPL: NORMAL
BUN SERPL-MCNC: 29 MG/DL (ref 6–20)
BUN/CREAT SERPL: 23 (ref 12–20)
CALCIUM SERPL-MCNC: 9.6 MG/DL (ref 8.5–10.1)
CANNABINOIDS UR QL SCN: POSITIVE
CHLORIDE SERPL-SCNC: 106 MMOL/L (ref 97–108)
CO2 SERPL-SCNC: 27 MMOL/L (ref 21–32)
COCAINE UR QL SCN: NEGATIVE
COLOR UR: ABNORMAL
CREAT SERPL-MCNC: 1.26 MG/DL (ref 0.7–1.3)
EPITH CASTS URNS QL MICRO: ABNORMAL /LPF
ERYTHROCYTE [DISTWIDTH] IN BLOOD BY AUTOMATED COUNT: 12.5 % (ref 11.5–14.5)
EST. AVERAGE GLUCOSE BLD GHB EST-MCNC: 108 MG/DL
GLOBULIN SER CALC-MCNC: 3.6 G/DL (ref 2–4)
GLUCOSE SERPL-MCNC: 106 MG/DL (ref 65–100)
GLUCOSE UR STRIP.AUTO-MCNC: 500 MG/DL
HBA1C MFR BLD: 5.4 % (ref 4–5.6)
HCT VFR BLD AUTO: 47.9 % (ref 36.6–50.3)
HGB BLD-MCNC: 17.1 G/DL (ref 12.1–17)
HGB UR QL STRIP: NEGATIVE
HYALINE CASTS URNS QL MICRO: ABNORMAL /LPF (ref 0–2)
INR PPP: 1.1 (ref 0.9–1.1)
KETONES UR QL STRIP.AUTO: ABNORMAL MG/DL
LEUKOCYTE ESTERASE UR QL STRIP.AUTO: NEGATIVE
Lab: ABNORMAL
MCH RBC QN AUTO: 31.7 PG (ref 26–34)
MCHC RBC AUTO-ENTMCNC: 35.7 G/DL (ref 30–36.5)
MCV RBC AUTO: 88.9 FL (ref 80–99)
METHADONE UR QL: NEGATIVE
NITRITE UR QL STRIP.AUTO: NEGATIVE
NRBC # BLD: 0 K/UL (ref 0–0.01)
NRBC BLD-RTO: 0 PER 100 WBC
OPIATES UR QL: NEGATIVE
PCP UR QL: NEGATIVE
PH UR STRIP: 5.5 (ref 5–8)
PLATELET # BLD AUTO: 252 K/UL (ref 150–400)
PMV BLD AUTO: 8.9 FL (ref 8.9–12.9)
POTASSIUM SERPL-SCNC: 3.9 MMOL/L (ref 3.5–5.1)
PREALB SERPL-MCNC: 37.9 MG/DL (ref 20–40)
PROT SERPL-MCNC: 7.5 G/DL (ref 6.4–8.2)
PROT UR STRIP-MCNC: NEGATIVE MG/DL
PROTHROMBIN TIME: 11.1 SEC (ref 9.2–11.2)
RBC # BLD AUTO: 5.39 M/UL (ref 4.1–5.7)
RBC #/AREA URNS HPF: ABNORMAL /HPF (ref 0–5)
SODIUM SERPL-SCNC: 138 MMOL/L (ref 136–145)
SP GR UR REFRACTOMETRY: >1.03 (ref 1–1.03)
SPECIMEN EXP DATE BLD: NORMAL
URINE CULTURE IF INDICATED: ABNORMAL
UROBILINOGEN UR QL STRIP.AUTO: 0.2 EU/DL (ref 0.2–1)
WBC # BLD AUTO: 10.1 K/UL (ref 4.1–11.1)
WBC URNS QL MICRO: ABNORMAL /HPF (ref 0–4)

## 2025-02-03 PROCEDURE — 85610 PROTHROMBIN TIME: CPT

## 2025-02-03 PROCEDURE — 71046 X-RAY EXAM CHEST 2 VIEWS: CPT

## 2025-02-03 PROCEDURE — 80053 COMPREHEN METABOLIC PANEL: CPT

## 2025-02-03 PROCEDURE — 86850 RBC ANTIBODY SCREEN: CPT

## 2025-02-03 PROCEDURE — 82306 VITAMIN D 25 HYDROXY: CPT

## 2025-02-03 PROCEDURE — 85027 COMPLETE CBC AUTOMATED: CPT

## 2025-02-03 PROCEDURE — 86900 BLOOD TYPING SEROLOGIC ABO: CPT

## 2025-02-03 PROCEDURE — 86901 BLOOD TYPING SEROLOGIC RH(D): CPT

## 2025-02-03 PROCEDURE — 83036 HEMOGLOBIN GLYCOSYLATED A1C: CPT

## 2025-02-03 PROCEDURE — 97161 PT EVAL LOW COMPLEX 20 MIN: CPT

## 2025-02-03 PROCEDURE — 80307 DRUG TEST PRSMV CHEM ANLYZR: CPT

## 2025-02-03 PROCEDURE — 84134 ASSAY OF PREALBUMIN: CPT

## 2025-02-03 PROCEDURE — 81001 URINALYSIS AUTO W/SCOPE: CPT

## 2025-02-03 PROCEDURE — 97530 THERAPEUTIC ACTIVITIES: CPT

## 2025-02-03 PROCEDURE — 36415 COLL VENOUS BLD VENIPUNCTURE: CPT

## 2025-02-03 RX ORDER — PREGABALIN 150 MG/1
150 CAPSULE ORAL 2 TIMES DAILY
Status: CANCELLED | OUTPATIENT
Start: 2025-02-11

## 2025-02-03 RX ORDER — ACETAMINOPHEN 500 MG
1000 TABLET ORAL ONCE
OUTPATIENT
Start: 2025-02-11

## 2025-02-03 RX ORDER — CEFAZOLIN SODIUM/WATER 2 G/20 ML
2000 SYRINGE (ML) INTRAVENOUS ONCE
OUTPATIENT
Start: 2025-02-11

## 2025-02-03 RX ORDER — FINASTERIDE 5 MG/1
5 TABLET, FILM COATED ORAL DAILY
COMMUNITY

## 2025-02-03 RX ORDER — M-VIT,TX,IRON,MINS/CALC/FOLIC 27MG-0.4MG
1 TABLET ORAL DAILY
COMMUNITY

## 2025-02-03 RX ORDER — SODIUM CHLORIDE, SODIUM LACTATE, POTASSIUM CHLORIDE, CALCIUM CHLORIDE 600; 310; 30; 20 MG/100ML; MG/100ML; MG/100ML; MG/100ML
INJECTION, SOLUTION INTRAVENOUS CONTINUOUS
OUTPATIENT
Start: 2025-02-11

## 2025-02-03 RX ORDER — LOSARTAN POTASSIUM AND HYDROCHLOROTHIAZIDE 25; 100 MG/1; MG/1
1 TABLET ORAL DAILY
COMMUNITY

## 2025-02-03 ASSESSMENT — PAIN DESCRIPTION - DESCRIPTORS: DESCRIPTORS: TINGLING;NUMBNESS

## 2025-02-03 ASSESSMENT — PAIN DESCRIPTION - ORIENTATION: ORIENTATION: RIGHT

## 2025-02-03 ASSESSMENT — PAIN SCALES - GENERAL: PAINLEVEL_OUTOF10: 7

## 2025-02-03 ASSESSMENT — PAIN DESCRIPTION - LOCATION: LOCATION: NECK;ARM

## 2025-02-04 LAB
25(OH)D3 SERPL-MCNC: 66.3 NG/ML (ref 30–100)
BACTERIA SPEC CULT: NORMAL
BACTERIA SPEC CULT: NORMAL
SERVICE CMNT-IMP: NORMAL

## 2025-02-05 NOTE — PROGRESS NOTES
[facility]  Joint/Spine Preoperative Instructions        Surgery Date 2/11/25          Time of Arrival to be called 2/10/25 between 2-5 pm on [phone]     1. On the day of your surgery, please report to the Surgical Services Registration Desk and sign in at your designated time. The Surgery Center is located to the right of the Emergency Room.     2. You must have someone with you to drive you home. You should not drive a car for 24 hours following surgery. Please make arrangements for a friend or family member to stay with you for the first 24 hours after your surgery.    3. No food after midnight 2/10/25.  Medications morning of surgery should be taken with a sip of water.  Please follow pre-surgery drink instructions that were given at your Pre Admission Testing appointment.      4. We recommend you do not drink any alcoholic beverages for 24 hours before and after your surgery.    5. Contact your surgeon’s office for instructions on the following medications: non-steroidal anti-inflammatory drugs (i.e. Advil, Aleve), vitamins, and supplements. (Some surgeon’s will want you to stop these medications prior to surgery and others may allow you to take them)  **If you are currently taking Plavix, Coumadin, Aspirin and/or other blood-thinning agents, contact your surgeon for instructions.** Your surgeon will partner with the physician prescribing these medications to determine if it is safe to stop or if you need to continue taking.  Please do not stop taking these medications without instructions from your surgeon    6. Wear comfortable clothes.  Wear glasses instead of contacts.  Do not bring any money or jewelry. Please bring picture ID, insurance card, and any prearranged co-payment or hospital payment.  Do not wear make-up, particularly mascara the morning of your surgery.  Do not wear nail polish, particularly if you are having foot /hand surgery.  Wear your hair loose or 
    [facility]  [address] [place] [address]    PHYSICAL THERAPY PRE-SURGERY EVALUATION       Date: 2/3/2025  Patient: Tin Mckeon (59 y.o. male)  : 1965  Medical Diagnosis: Encounter for other preprocedural examination [Z01.818]  Procedure(s) (LRB):  CERVICAL 4 TO CERVICAL 7 ANTERIOR  CERVICAL DISCECTOMY AND FUSION (N/A)     Treatment Diagnosis: M54.2  NECK PAIN    Referral Source: Thang Zhou MD  Provider #: Tahng Zhou NPI#: [id number]   Precautions:    BLTs, log rolling, cervical collar    ASSESSMENT :  Based on the objective data described below, the patient presents with impaired use of right UE, pain and sensory changes due to end stage degenerative joint disease in the cervical spine.     Discussed anticipated disposition to home with possible discharge within a 0 to 2 day time frame post-surgery. Patient's  was not present for the session.  Patient in agreement.     Anticipate patient will need acute PT and OT orders based on expected deficits post surgery.      The patient indicated he is  interested to discharge day of surgery if all discharge criteria met. Patient voiced good  understanding of therapy specific criteria to discharge day of surgery. Patient with good potential for discharging same day of surgery based on PMH, social support, current condition, and fall history.  Patient is very active and goes to the gym regularly.    GOALS: (Goals have been discussed and agreed upon with patient.)  DISCHARGE GOALS: Time Frame: 1 DAY  Patient will demonstrate increased strength, range of motion, and pain control via a home exercise program in order to minimize functional deficits in preparation for their upcoming surgery. This will be achieved by using education, demonstration, and through the use of an informational handout including a home exercise program.  REHABILITATION POTENTIAL FOR STATED GOALS: Good     RECOMMENDATIONS AND PLANNED 
Clearance received from PCP and Cardiology.  PCP states that the patient needs endocrine clearance.  Patient called to notify and schedule this.prior to surgery.    EKG done by cardiology on 1/13/25 and place in chart.  
Faxed clearance form to Dr Lakisha Marrero to complete. Fax confirmed.   
Incentive Spirometer        Using the incentive spirometer helps expand the small air sacs of your lungs, helps you breathe deeply, and helps improve your lung function.  Use your incentive spirometer twice a day (10 breaths each time) prior to surgery.      How to Use Your Incentive Spirometer:  Hold the incentive spirometer in an upright position.   Breathe out as usual.   Place the mouthpiece in your mouth and seal your lips tightly around it.   Take a deep breath.  Breathe in slowly and as deeply as possible. Keep the blue flow rate guide between the arrows.   Hold your breath as long as possible. Then exhale slowly and allow the piston to fall to the bottom of the column.   Rest for a few seconds and repeat steps one through five at least 10 times.     PAT Tidal Volume_______2500___________  x___________2_____  Date__________2/3/25_____________    BRING THE INCENTIVE SPIROMETER WITH YOU TO THE HOSPITAL ON THE DAY OF YOUR SURGERY.  Opportunity given to ask and answer questions as well as to observe return demonstration.    Patient signature_____________________________    Witness____________________________  
Orthopedic and Spine Patients:  Instructions on When You Can   Eat or Drink Before Surgery      You have been provided 2 pre-surgery drinks received at your pre-admission testing appointment.    Night before surgery:  You should drink one bottle of the  pre-surgery drink at bedtime. No food after midnight!    Day of Surgery:  Complete 2nd bottle of the pre-surgery drink 1 hour prior to arrival at hospital.  For questions call Pre-Admission Testing at [phone].  They are available from 8:00am-5:00pm, Monday through Friday.  
[facility]  \"We've Got Your Back! Caring For Yourself Before and After Spine Surgery\" handbook was provided and the patient viewed the spine video during their pre-admission testing appointment. An opportunity for questions was provided, patient verbalized understanding.     The patient viewed the spine video during pre-admission testing.  
surgery:  Shower again thoroughly with an antibacterial soap, such as Dial or the soap provided at your preassessment appointment. If needed, ask someone for help to reach all areas of your body. Don’t forget to clean your belly button! Rinse.  With bottle of Hibiclens/Chlorhexidine in hand, turn water off.  Apply Hibiclens antiseptic skin cleanser with a clean, freshly washed washcloth.  Gently apply to your body from chin to toes (except the genital area) and especially the area(s) where your incision(s) will be.  Leave Hibiclens/Chlorhexidine on your skin for at least 20 seconds.     CAUTION: If needed, Hibiclens/chlorhexidine may be used to clean the folds of skin of the legs (such as in the area of the groin) and on your buttocks and hips. However, do not use Hibiclens/Chlorhexidine above the neck or in the genital area (your bottom) or put inside any area of your body.  Turn the water back on and rinse.  Dry gently with a clean, freshly washed towel.  After your shower, do not use any powder, deodorant, perfumes or lotion prior to surgery.  Put on clean, freshly washed clothing.    Tips to help prevent infections after your surgery:  Protect your surgical wound from germs:  Hand washing is the most important thing you and your caregivers can do to prevent infections.  Keep your bandage clean and dry!  Do not touch your surgical wound.  Use clean, freshly washed towels and washcloths every time you shower; do not share bath linens with others.  Until your surgical wound is healed, wear clothing and sleep on bed linens each day that are clean and freshly washed.  Do not allow pets to sleep in your bed with you or touch your surgical wound.  Do not smoke - smoking delays wound healing. This may be a good time to stop smoking.  If you have diabetes, it is important for you to manage your blood sugar levels properly before your surgery as well as after your surgery. Poorly managed blood sugar levels slow down wound

## 2025-02-07 NOTE — DISCHARGE INSTRUCTIONS
[facility]  Orthopedics    Dr. Thang Mckeon    Discharge Instruction Sheet: Anterior Cervical Fusion      Pain control:  Typically, we will prescribe a narcotic usually 1-2 tabs every four hours is sufficient for the pain. Most patients need this only for the first few weeks. You should discontinue this as the pain decreases.     You should not drive while taking any narcotic pain medications.    Constipation:  Pain medicines and anesthesia can be constipating-this can be prevented by gentle physical activity and drinking plenty of fluid. It should be treated with over-the-counter medications such as Miralax or suppositories, and/or Fleets enema. You should have a bowel movement at least every other day following surgery.    Incision care:   Keep this area clean and dry. Do not remove the dressing.  DO NOT take a tub bath or go swimming until cleared by your doctor. DO NOT apply lotions, oils, or creams to incision.  Cover the wound with an impermiable dressing to shower for then next 5 days, then no cover is needed.  Wear cervical collar for comfort.    If staples are in place, they should be removed about 14-20 days after surgery.    To increase and promote healing:  Stop Smoking (or at least cut back on smoking).  Eat a well-balanced diet (high in protein and vitamin C)  If your appetite is poor, consider nutritional supplements like Ensure, Glucerna, or [place] Instant Breakfast.  If you are diabetic, controlling you blood sugars is very important to prevent infection and promote wound healing.      Nutrition:  If you were on a supplement such as Ensure or Glucerna) while in the hospital, please continue using them with each meal for the next 30 days.  Eat a well-balanced diet - High in protein, high in vitamins and minerals, especially vitamin C and zinc.     Restrictions:  Limit bending and twisting  Wear collar as instructed  Lift no

## 2025-02-11 ENCOUNTER — APPOINTMENT (OUTPATIENT)
Facility: HOSPITAL | Age: 60
End: 2025-02-11
Attending: ORTHOPAEDIC SURGERY
Payer: COMMERCIAL

## 2025-02-11 ENCOUNTER — ANESTHESIA EVENT (OUTPATIENT)
Facility: HOSPITAL | Age: 60
End: 2025-02-11
Payer: COMMERCIAL

## 2025-02-11 ENCOUNTER — HOSPITAL ENCOUNTER (OUTPATIENT)
Facility: HOSPITAL | Age: 60
Setting detail: OBSERVATION
Discharge: HOME | End: 2025-02-12
Attending: ORTHOPAEDIC SURGERY | Admitting: ORTHOPAEDIC SURGERY
Payer: COMMERCIAL

## 2025-02-11 ENCOUNTER — ANESTHESIA (OUTPATIENT)
Facility: HOSPITAL | Age: 60
End: 2025-02-11
Payer: COMMERCIAL

## 2025-02-11 DIAGNOSIS — Z98.1 S/P CERVICAL SPINAL FUSION: Primary | ICD-10-CM

## 2025-02-11 LAB
ALBUMIN SERPL-MCNC: 3.9 G/DL (ref 3.5–5)
ALBUMIN/GLOB SERPL: 1.1 (ref 1.1–2.2)
ALP SERPL-CCNC: 50 U/L (ref 45–117)
ALT SERPL-CCNC: 35 U/L (ref 12–78)
ANION GAP SERPL CALC-SCNC: 5 MMOL/L (ref 2–12)
AST SERPL-CCNC: 27 U/L (ref 15–37)
BILIRUB SERPL-MCNC: 2.2 MG/DL (ref 0.2–1)
BUN SERPL-MCNC: 17 MG/DL (ref 6–20)
BUN/CREAT SERPL: 16 (ref 12–20)
CALCIUM SERPL-MCNC: 9.5 MG/DL (ref 8.5–10.1)
CHLORIDE SERPL-SCNC: 106 MMOL/L (ref 97–108)
CO2 SERPL-SCNC: 26 MMOL/L (ref 21–32)
CREAT SERPL-MCNC: 1.09 MG/DL (ref 0.7–1.3)
ERYTHROCYTE [DISTWIDTH] IN BLOOD BY AUTOMATED COUNT: 13.2 % (ref 11.5–14.5)
GLOBULIN SER CALC-MCNC: 3.5 G/DL (ref 2–4)
GLUCOSE BLD STRIP.AUTO-MCNC: 119 MG/DL (ref 65–117)
GLUCOSE BLD STRIP.AUTO-MCNC: 131 MG/DL (ref 65–117)
GLUCOSE SERPL-MCNC: 122 MG/DL (ref 65–100)
HCT VFR BLD AUTO: 49.1 % (ref 36.6–50.3)
HGB BLD-MCNC: 17.3 G/DL (ref 12.1–17)
MCH RBC QN AUTO: 32.2 PG (ref 26–34)
MCHC RBC AUTO-ENTMCNC: 35.2 G/DL (ref 30–36.5)
MCV RBC AUTO: 91.3 FL (ref 80–99)
NRBC # BLD: 0 K/UL (ref 0–0.01)
NRBC BLD-RTO: 0 PER 100 WBC
PLATELET # BLD AUTO: 181 K/UL (ref 150–400)
PMV BLD AUTO: 8.4 FL (ref 8.9–12.9)
POTASSIUM SERPL-SCNC: 3.8 MMOL/L (ref 3.5–5.1)
PROT SERPL-MCNC: 7.4 G/DL (ref 6.4–8.2)
RBC # BLD AUTO: 5.38 M/UL (ref 4.1–5.7)
SERVICE CMNT-IMP: ABNORMAL
SERVICE CMNT-IMP: ABNORMAL
SODIUM SERPL-SCNC: 137 MMOL/L (ref 136–145)
WBC # BLD AUTO: 5.2 K/UL (ref 4.1–11.1)

## 2025-02-11 PROCEDURE — C1889 IMPLANT/INSERT DEVICE, NOC: HCPCS | Performed by: ORTHOPAEDIC SURGERY

## 2025-02-11 PROCEDURE — 82962 GLUCOSE BLOOD TEST: CPT

## 2025-02-11 PROCEDURE — 2580000003 HC RX 258: Performed by: ANESTHESIOLOGY

## 2025-02-11 PROCEDURE — 3700000001 HC ADD 15 MINUTES (ANESTHESIA): Performed by: ORTHOPAEDIC SURGERY

## 2025-02-11 PROCEDURE — 85027 COMPLETE CBC AUTOMATED: CPT

## 2025-02-11 PROCEDURE — 3600000014 HC SURGERY LEVEL 4 ADDTL 15MIN: Performed by: ORTHOPAEDIC SURGERY

## 2025-02-11 PROCEDURE — 1100000000 HC RM PRIVATE

## 2025-02-11 PROCEDURE — 36415 COLL VENOUS BLD VENIPUNCTURE: CPT

## 2025-02-11 PROCEDURE — 6360000002 HC RX W HCPCS: Performed by: ORTHOPAEDIC SURGERY

## 2025-02-11 PROCEDURE — 80053 COMPREHEN METABOLIC PANEL: CPT

## 2025-02-11 PROCEDURE — 2500000003 HC RX 250 WO HCPCS

## 2025-02-11 PROCEDURE — 76000 FLUOROSCOPY <1 HR PHYS/QHP: CPT

## 2025-02-11 PROCEDURE — 3700000000 HC ANESTHESIA ATTENDED CARE: Performed by: ORTHOPAEDIC SURGERY

## 2025-02-11 PROCEDURE — 6360000002 HC RX W HCPCS

## 2025-02-11 PROCEDURE — 6370000000 HC RX 637 (ALT 250 FOR IP): Performed by: PHYSICIAN ASSISTANT

## 2025-02-11 PROCEDURE — 6370000000 HC RX 637 (ALT 250 FOR IP): Performed by: ORTHOPAEDIC SURGERY

## 2025-02-11 PROCEDURE — 2500000003 HC RX 250 WO HCPCS: Performed by: PHYSICIAN ASSISTANT

## 2025-02-11 PROCEDURE — 3600000004 HC SURGERY LEVEL 4 BASE: Performed by: ORTHOPAEDIC SURGERY

## 2025-02-11 PROCEDURE — C1713 ANCHOR/SCREW BN/BN,TIS/BN: HCPCS | Performed by: ORTHOPAEDIC SURGERY

## 2025-02-11 PROCEDURE — 7100000000 HC PACU RECOVERY - FIRST 15 MIN: Performed by: ORTHOPAEDIC SURGERY

## 2025-02-11 PROCEDURE — 72040 X-RAY EXAM NECK SPINE 2-3 VW: CPT

## 2025-02-11 PROCEDURE — 2709999900 HC NON-CHARGEABLE SUPPLY: Performed by: ORTHOPAEDIC SURGERY

## 2025-02-11 PROCEDURE — 2720000010 HC SURG SUPPLY STERILE: Performed by: ORTHOPAEDIC SURGERY

## 2025-02-11 PROCEDURE — 2580000003 HC RX 258

## 2025-02-11 PROCEDURE — 7100000001 HC PACU RECOVERY - ADDTL 15 MIN: Performed by: ORTHOPAEDIC SURGERY

## 2025-02-11 PROCEDURE — 2500000003 HC RX 250 WO HCPCS: Performed by: ORTHOPAEDIC SURGERY

## 2025-02-11 PROCEDURE — 6360000002 HC RX W HCPCS: Performed by: PHYSICIAN ASSISTANT

## 2025-02-11 DEVICE — ALLOGRAFT BNE SM 5 CC DBM FIBER OSTEOSTRAND PLUS: Type: IMPLANTABLE DEVICE | Site: SPINE CERVICAL | Status: FUNCTIONAL

## 2025-02-11 DEVICE — 3.5MM VARIABLE SCREW, SELF DRILLING, 18MM
Type: IMPLANTABLE DEVICE | Site: SPINE CERVICAL | Status: FUNCTIONAL
Brand: SHORELINE ACS

## 2025-02-11 DEVICE — INTERBODY, 18X15X8MM, 15 DEGREE, 3D
Type: IMPLANTABLE DEVICE | Site: SPINE CERVICAL | Status: FUNCTIONAL
Brand: 3D PRINTED INTERBODY SYSTEMS

## 2025-02-11 DEVICE — 8MM, LOCKING COVER
Type: IMPLANTABLE DEVICE | Site: SPINE CERVICAL | Status: FUNCTIONAL
Brand: SHORELINE ACS

## 2025-02-11 DEVICE — 8MM, 2-HOLE ANTERIOR PLATE
Type: IMPLANTABLE DEVICE | Site: SPINE CERVICAL | Status: FUNCTIONAL
Brand: SHORELINE ACS

## 2025-02-11 DEVICE — 7MM, LOCKING COVER
Type: IMPLANTABLE DEVICE | Site: SPINE CERVICAL | Status: FUNCTIONAL
Brand: SHORELINE ACS

## 2025-02-11 DEVICE — 7MM, 2-HOLE ANTERIOR PLATE
Type: IMPLANTABLE DEVICE | Site: SPINE CERVICAL | Status: FUNCTIONAL
Brand: SHORELINE ACS

## 2025-02-11 DEVICE — 7MM, 4-HOLE ANTERIOR PLATE
Type: IMPLANTABLE DEVICE | Site: SPINE CERVICAL | Status: FUNCTIONAL
Brand: SHORELINE ACS

## 2025-02-11 RX ORDER — HYDROMORPHONE HYDROCHLORIDE 1 MG/ML
0.5 INJECTION, SOLUTION INTRAMUSCULAR; INTRAVENOUS; SUBCUTANEOUS
Status: DISCONTINUED | OUTPATIENT
Start: 2025-02-11 | End: 2025-02-12

## 2025-02-11 RX ORDER — TRANEXAMIC ACID 100 MG/ML
INJECTION, SOLUTION INTRAVENOUS
Status: DISCONTINUED | OUTPATIENT
Start: 2025-02-11 | End: 2025-02-11 | Stop reason: SDUPTHER

## 2025-02-11 RX ORDER — SODIUM CHLORIDE 0.9 % (FLUSH) 0.9 %
5-40 SYRINGE (ML) INJECTION EVERY 12 HOURS SCHEDULED
Status: DISCONTINUED | OUTPATIENT
Start: 2025-02-11 | End: 2025-02-11 | Stop reason: HOSPADM

## 2025-02-11 RX ORDER — SODIUM CHLORIDE 9 MG/ML
INJECTION, SOLUTION INTRAVENOUS PRN
Status: DISCONTINUED | OUTPATIENT
Start: 2025-02-11 | End: 2025-02-11 | Stop reason: HOSPADM

## 2025-02-11 RX ORDER — ONDANSETRON 2 MG/ML
4 INJECTION INTRAMUSCULAR; INTRAVENOUS EVERY 6 HOURS PRN
Status: DISCONTINUED | OUTPATIENT
Start: 2025-02-11 | End: 2025-02-12 | Stop reason: HOSPADM

## 2025-02-11 RX ORDER — HYDROXYZINE HYDROCHLORIDE 10 MG/1
10 TABLET, FILM COATED ORAL EVERY 8 HOURS PRN
Status: DISCONTINUED | OUTPATIENT
Start: 2025-02-11 | End: 2025-02-12 | Stop reason: HOSPADM

## 2025-02-11 RX ORDER — METFORMIN HYDROCHLORIDE 500 MG/1
1000 TABLET, EXTENDED RELEASE ORAL
Status: DISCONTINUED | OUTPATIENT
Start: 2025-02-12 | End: 2025-02-12 | Stop reason: HOSPADM

## 2025-02-11 RX ORDER — NEOSTIGMINE METHYLSULFATE 1 MG/ML
INJECTION, SOLUTION INTRAVENOUS
Status: DISCONTINUED | OUTPATIENT
Start: 2025-02-11 | End: 2025-02-11 | Stop reason: SDUPTHER

## 2025-02-11 RX ORDER — FENTANYL CITRATE 50 UG/ML
INJECTION, SOLUTION INTRAMUSCULAR; INTRAVENOUS
Status: DISCONTINUED | OUTPATIENT
Start: 2025-02-11 | End: 2025-02-11 | Stop reason: SDUPTHER

## 2025-02-11 RX ORDER — ONDANSETRON 2 MG/ML
INJECTION INTRAMUSCULAR; INTRAVENOUS
Status: DISCONTINUED | OUTPATIENT
Start: 2025-02-11 | End: 2025-02-11 | Stop reason: SDUPTHER

## 2025-02-11 RX ORDER — SENNA AND DOCUSATE SODIUM 50; 8.6 MG/1; MG/1
1 TABLET, FILM COATED ORAL 2 TIMES DAILY
Status: DISCONTINUED | OUTPATIENT
Start: 2025-02-11 | End: 2025-02-12 | Stop reason: HOSPADM

## 2025-02-11 RX ORDER — SODIUM CHLORIDE 0.9 % (FLUSH) 0.9 %
5-40 SYRINGE (ML) INJECTION PRN
Status: DISCONTINUED | OUTPATIENT
Start: 2025-02-11 | End: 2025-02-11 | Stop reason: HOSPADM

## 2025-02-11 RX ORDER — FENTANYL CITRATE 50 UG/ML
25 INJECTION, SOLUTION INTRAMUSCULAR; INTRAVENOUS EVERY 5 MIN PRN
Status: DISCONTINUED | OUTPATIENT
Start: 2025-02-11 | End: 2025-02-11 | Stop reason: HOSPADM

## 2025-02-11 RX ORDER — SODIUM CHLORIDE 0.9 % (FLUSH) 0.9 %
5-40 SYRINGE (ML) INJECTION PRN
Status: DISCONTINUED | OUTPATIENT
Start: 2025-02-11 | End: 2025-02-12 | Stop reason: HOSPADM

## 2025-02-11 RX ORDER — HYDROCHLOROTHIAZIDE 25 MG/1
25 TABLET ORAL DAILY
Status: DISCONTINUED | OUTPATIENT
Start: 2025-02-11 | End: 2025-02-12 | Stop reason: HOSPADM

## 2025-02-11 RX ORDER — DEXAMETHASONE SODIUM PHOSPHATE 4 MG/ML
INJECTION, SOLUTION INTRA-ARTICULAR; INTRALESIONAL; INTRAMUSCULAR; INTRAVENOUS; SOFT TISSUE
Status: DISCONTINUED | OUTPATIENT
Start: 2025-02-11 | End: 2025-02-11 | Stop reason: SDUPTHER

## 2025-02-11 RX ORDER — LIDOCAINE HYDROCHLORIDE 20 MG/ML
INJECTION, SOLUTION EPIDURAL; INFILTRATION; INTRACAUDAL; PERINEURAL
Status: DISCONTINUED | OUTPATIENT
Start: 2025-02-11 | End: 2025-02-11 | Stop reason: SDUPTHER

## 2025-02-11 RX ORDER — ASPIRIN 81 MG/1
81 TABLET ORAL DAILY
Status: DISCONTINUED | OUTPATIENT
Start: 2025-02-12 | End: 2025-02-12 | Stop reason: HOSPADM

## 2025-02-11 RX ORDER — POLYETHYLENE GLYCOL 3350 17 G/17G
17 POWDER, FOR SOLUTION ORAL DAILY
Status: DISCONTINUED | OUTPATIENT
Start: 2025-02-11 | End: 2025-02-12 | Stop reason: HOSPADM

## 2025-02-11 RX ORDER — OXYCODONE HYDROCHLORIDE 5 MG/1
5 TABLET ORAL
Status: DISCONTINUED | OUTPATIENT
Start: 2025-02-11 | End: 2025-02-11 | Stop reason: HOSPADM

## 2025-02-11 RX ORDER — NALOXONE HYDROCHLORIDE 0.4 MG/ML
INJECTION, SOLUTION INTRAMUSCULAR; INTRAVENOUS; SUBCUTANEOUS PRN
Status: DISCONTINUED | OUTPATIENT
Start: 2025-02-11 | End: 2025-02-11 | Stop reason: HOSPADM

## 2025-02-11 RX ORDER — OXYCODONE HYDROCHLORIDE 5 MG/1
10 TABLET ORAL
Status: DISCONTINUED | OUTPATIENT
Start: 2025-02-11 | End: 2025-02-12 | Stop reason: HOSPADM

## 2025-02-11 RX ORDER — HYDRALAZINE HYDROCHLORIDE 20 MG/ML
10 INJECTION INTRAMUSCULAR; INTRAVENOUS ONCE
Status: DISCONTINUED | OUTPATIENT
Start: 2025-02-11 | End: 2025-02-11 | Stop reason: HOSPADM

## 2025-02-11 RX ORDER — ONDANSETRON 2 MG/ML
4 INJECTION INTRAMUSCULAR; INTRAVENOUS
Status: DISCONTINUED | OUTPATIENT
Start: 2025-02-11 | End: 2025-02-11 | Stop reason: HOSPADM

## 2025-02-11 RX ORDER — SODIUM CHLORIDE, SODIUM LACTATE, POTASSIUM CHLORIDE, CALCIUM CHLORIDE 600; 310; 30; 20 MG/100ML; MG/100ML; MG/100ML; MG/100ML
INJECTION, SOLUTION INTRAVENOUS CONTINUOUS
Status: DISCONTINUED | OUTPATIENT
Start: 2025-02-11 | End: 2025-02-11 | Stop reason: HOSPADM

## 2025-02-11 RX ORDER — ACETAMINOPHEN 500 MG
1000 TABLET ORAL EVERY 6 HOURS
Status: DISCONTINUED | OUTPATIENT
Start: 2025-02-11 | End: 2025-02-12 | Stop reason: HOSPADM

## 2025-02-11 RX ORDER — ACETAMINOPHEN 500 MG
1000 TABLET ORAL ONCE
Status: COMPLETED | OUTPATIENT
Start: 2025-02-11 | End: 2025-02-11

## 2025-02-11 RX ORDER — BACLOFEN 10 MG/1
10 TABLET ORAL 2 TIMES DAILY
Status: DISCONTINUED | OUTPATIENT
Start: 2025-02-11 | End: 2025-02-12 | Stop reason: HOSPADM

## 2025-02-11 RX ORDER — ROCURONIUM BROMIDE 10 MG/ML
INJECTION, SOLUTION INTRAVENOUS
Status: DISCONTINUED | OUTPATIENT
Start: 2025-02-11 | End: 2025-02-11 | Stop reason: SDUPTHER

## 2025-02-11 RX ORDER — LIDOCAINE HYDROCHLORIDE 10 MG/ML
1 INJECTION, SOLUTION EPIDURAL; INFILTRATION; INTRACAUDAL; PERINEURAL
Status: DISCONTINUED | OUTPATIENT
Start: 2025-02-11 | End: 2025-02-11 | Stop reason: HOSPADM

## 2025-02-11 RX ORDER — TIZANIDINE 2 MG/1
2 TABLET ORAL DAILY
Status: DISCONTINUED | OUTPATIENT
Start: 2025-02-11 | End: 2025-02-12 | Stop reason: HOSPADM

## 2025-02-11 RX ORDER — FINASTERIDE 5 MG/1
5 TABLET, FILM COATED ORAL DAILY
Status: DISCONTINUED | OUTPATIENT
Start: 2025-02-11 | End: 2025-02-12 | Stop reason: HOSPADM

## 2025-02-11 RX ORDER — LOSARTAN POTASSIUM AND HYDROCHLOROTHIAZIDE 25; 100 MG/1; MG/1
1 TABLET ORAL DAILY
Status: DISCONTINUED | OUTPATIENT
Start: 2025-02-11 | End: 2025-02-11

## 2025-02-11 RX ORDER — PROPOFOL 10 MG/ML
INJECTION, EMULSION INTRAVENOUS
Status: DISCONTINUED | OUTPATIENT
Start: 2025-02-11 | End: 2025-02-11 | Stop reason: SDUPTHER

## 2025-02-11 RX ORDER — PROCHLORPERAZINE EDISYLATE 5 MG/ML
5 INJECTION INTRAMUSCULAR; INTRAVENOUS
Status: DISCONTINUED | OUTPATIENT
Start: 2025-02-11 | End: 2025-02-11 | Stop reason: HOSPADM

## 2025-02-11 RX ORDER — MIDAZOLAM HYDROCHLORIDE 2 MG/2ML
2 INJECTION, SOLUTION INTRAMUSCULAR; INTRAVENOUS PRN
Status: DISCONTINUED | OUTPATIENT
Start: 2025-02-11 | End: 2025-02-11 | Stop reason: HOSPADM

## 2025-02-11 RX ORDER — HYDROMORPHONE HYDROCHLORIDE 1 MG/ML
0.5 INJECTION, SOLUTION INTRAMUSCULAR; INTRAVENOUS; SUBCUTANEOUS EVERY 5 MIN PRN
Status: DISCONTINUED | OUTPATIENT
Start: 2025-02-11 | End: 2025-02-11 | Stop reason: HOSPADM

## 2025-02-11 RX ORDER — OXYCODONE HYDROCHLORIDE 5 MG/1
5 TABLET ORAL
Status: DISCONTINUED | OUTPATIENT
Start: 2025-02-11 | End: 2025-02-12 | Stop reason: HOSPADM

## 2025-02-11 RX ORDER — ONDANSETRON 2 MG/ML
4 INJECTION INTRAMUSCULAR; INTRAVENOUS AS NEEDED
Status: DISCONTINUED | OUTPATIENT
Start: 2025-02-11 | End: 2025-02-11 | Stop reason: HOSPADM

## 2025-02-11 RX ORDER — DIAZEPAM 5 MG/1
5 TABLET ORAL EVERY 6 HOURS PRN
Status: DISCONTINUED | OUTPATIENT
Start: 2025-02-11 | End: 2025-02-12

## 2025-02-11 RX ORDER — FAMOTIDINE 20 MG/1
20 TABLET, FILM COATED ORAL 2 TIMES DAILY
Status: DISCONTINUED | OUTPATIENT
Start: 2025-02-11 | End: 2025-02-12 | Stop reason: HOSPADM

## 2025-02-11 RX ORDER — SUCCINYLCHOLINE/SOD CL,ISO/PF 200MG/10ML
SYRINGE (ML) INTRAVENOUS
Status: DISCONTINUED | OUTPATIENT
Start: 2025-02-11 | End: 2025-02-11 | Stop reason: SDUPTHER

## 2025-02-11 RX ORDER — ONDANSETRON 4 MG/1
4 TABLET, ORALLY DISINTEGRATING ORAL EVERY 8 HOURS PRN
Status: DISCONTINUED | OUTPATIENT
Start: 2025-02-11 | End: 2025-02-12 | Stop reason: HOSPADM

## 2025-02-11 RX ORDER — M-VIT,TX,IRON,MINS/CALC/FOLIC 27MG-0.4MG
1 TABLET ORAL DAILY
Status: DISCONTINUED | OUTPATIENT
Start: 2025-02-11 | End: 2025-02-12 | Stop reason: HOSPADM

## 2025-02-11 RX ORDER — DEXMEDETOMIDINE HYDROCHLORIDE 100 UG/ML
INJECTION, SOLUTION INTRAVENOUS
Status: DISCONTINUED | OUTPATIENT
Start: 2025-02-11 | End: 2025-02-11 | Stop reason: SDUPTHER

## 2025-02-11 RX ORDER — SODIUM CHLORIDE 9 MG/ML
INJECTION, SOLUTION INTRAVENOUS CONTINUOUS
Status: DISCONTINUED | OUTPATIENT
Start: 2025-02-11 | End: 2025-02-12 | Stop reason: HOSPADM

## 2025-02-11 RX ORDER — EPHEDRINE SULFATE/0.9% NACL/PF 50 MG/5 ML
SYRINGE (ML) INTRAVENOUS
Status: DISCONTINUED | OUTPATIENT
Start: 2025-02-11 | End: 2025-02-11 | Stop reason: SDUPTHER

## 2025-02-11 RX ORDER — MIDAZOLAM HYDROCHLORIDE 2 MG/2ML
INJECTION, SOLUTION INTRAMUSCULAR; INTRAVENOUS
Status: DISCONTINUED | OUTPATIENT
Start: 2025-02-11 | End: 2025-02-11 | Stop reason: SDUPTHER

## 2025-02-11 RX ORDER — GLYCOPYRROLATE 0.2 MG/ML
INJECTION INTRAMUSCULAR; INTRAVENOUS
Status: DISCONTINUED | OUTPATIENT
Start: 2025-02-11 | End: 2025-02-11 | Stop reason: SDUPTHER

## 2025-02-11 RX ORDER — PHENYLEPHRINE HCL IN 0.9% NACL 0.4MG/10ML
SYRINGE (ML) INTRAVENOUS
Status: DISCONTINUED | OUTPATIENT
Start: 2025-02-11 | End: 2025-02-11 | Stop reason: SDUPTHER

## 2025-02-11 RX ORDER — LOSARTAN POTASSIUM 100 MG/1
100 TABLET ORAL DAILY
Status: DISCONTINUED | OUTPATIENT
Start: 2025-02-11 | End: 2025-02-12 | Stop reason: HOSPADM

## 2025-02-11 RX ORDER — GABAPENTIN 300 MG/1
600 CAPSULE ORAL 2 TIMES DAILY
Status: DISCONTINUED | OUTPATIENT
Start: 2025-02-11 | End: 2025-02-12 | Stop reason: HOSPADM

## 2025-02-11 RX ORDER — SODIUM CHLORIDE 0.9 % (FLUSH) 0.9 %
5-40 SYRINGE (ML) INJECTION EVERY 12 HOURS SCHEDULED
Status: DISCONTINUED | OUTPATIENT
Start: 2025-02-11 | End: 2025-02-12 | Stop reason: HOSPADM

## 2025-02-11 RX ORDER — ACETAMINOPHEN 500 MG
1000 TABLET ORAL ONCE
Status: DISCONTINUED | OUTPATIENT
Start: 2025-02-11 | End: 2025-02-11 | Stop reason: HOSPADM

## 2025-02-11 RX ORDER — HYDROMORPHONE HYDROCHLORIDE 1 MG/ML
1 INJECTION, SOLUTION INTRAMUSCULAR; INTRAVENOUS; SUBCUTANEOUS
Status: DISCONTINUED | OUTPATIENT
Start: 2025-02-11 | End: 2025-02-12

## 2025-02-11 RX ORDER — FENTANYL CITRATE 50 UG/ML
100 INJECTION, SOLUTION INTRAMUSCULAR; INTRAVENOUS
Status: DISCONTINUED | OUTPATIENT
Start: 2025-02-11 | End: 2025-02-11 | Stop reason: HOSPADM

## 2025-02-11 RX ORDER — PREGABALIN 150 MG/1
150 CAPSULE ORAL ONCE
Status: COMPLETED | OUTPATIENT
Start: 2025-02-11 | End: 2025-02-11

## 2025-02-11 RX ORDER — CYCLOBENZAPRINE HCL 10 MG
10 TABLET ORAL EVERY 12 HOURS PRN
Status: DISCONTINUED | OUTPATIENT
Start: 2025-02-11 | End: 2025-02-12

## 2025-02-11 RX ADMIN — SODIUM CHLORIDE, POTASSIUM CHLORIDE, SODIUM LACTATE AND CALCIUM CHLORIDE: 600; 310; 30; 20 INJECTION, SOLUTION INTRAVENOUS at 16:18

## 2025-02-11 RX ADMIN — PHENYLEPHRINE HYDROCHLORIDE 60 MCG/MIN: 10 INJECTION INTRAVENOUS at 15:22

## 2025-02-11 RX ADMIN — PROPOFOL 200 MG: 10 INJECTION, EMULSION INTRAVENOUS at 14:13

## 2025-02-11 RX ADMIN — HYDROMORPHONE HYDROCHLORIDE 1 MG: 1 INJECTION, SOLUTION INTRAMUSCULAR; INTRAVENOUS; SUBCUTANEOUS at 15:02

## 2025-02-11 RX ADMIN — FAMOTIDINE 20 MG: 20 TABLET, FILM COATED ORAL at 21:14

## 2025-02-11 RX ADMIN — Medication 5 MG: at 16:18

## 2025-02-11 RX ADMIN — ONDANSETRON 4 MG: 2 INJECTION INTRAMUSCULAR; INTRAVENOUS at 14:07

## 2025-02-11 RX ADMIN — PROPOFOL 100 MCG/KG/MIN: 10 INJECTION, EMULSION INTRAVENOUS at 14:23

## 2025-02-11 RX ADMIN — DEXMEDETOMIDINE 10 MCG: 100 INJECTION, SOLUTION INTRAVENOUS at 14:11

## 2025-02-11 RX ADMIN — Medication 80 MCG: at 15:17

## 2025-02-11 RX ADMIN — GABAPENTIN 600 MG: 300 CAPSULE ORAL at 21:14

## 2025-02-11 RX ADMIN — GLYCOPYRROLATE 0.6 MG: 0.2 INJECTION INTRAMUSCULAR; INTRAVENOUS at 16:18

## 2025-02-11 RX ADMIN — HYDROMORPHONE HYDROCHLORIDE 0.25 MG: 1 INJECTION, SOLUTION INTRAMUSCULAR; INTRAVENOUS; SUBCUTANEOUS at 16:19

## 2025-02-11 RX ADMIN — HYDROMORPHONE HYDROCHLORIDE 0.25 MG: 1 INJECTION, SOLUTION INTRAMUSCULAR; INTRAVENOUS; SUBCUTANEOUS at 16:14

## 2025-02-11 RX ADMIN — BACLOFEN 10 MG: 10 TABLET ORAL at 21:14

## 2025-02-11 RX ADMIN — ACETAMINOPHEN 1000 MG: 500 TABLET ORAL at 11:11

## 2025-02-11 RX ADMIN — OXYCODONE 10 MG: 5 TABLET ORAL at 17:08

## 2025-02-11 RX ADMIN — HYDROMORPHONE HYDROCHLORIDE 0.3 MG: 1 INJECTION, SOLUTION INTRAMUSCULAR; INTRAVENOUS; SUBCUTANEOUS at 16:10

## 2025-02-11 RX ADMIN — OXYCODONE 10 MG: 5 TABLET ORAL at 21:26

## 2025-02-11 RX ADMIN — SODIUM CHLORIDE, PRESERVATIVE FREE 10 ML: 5 INJECTION INTRAVENOUS at 21:15

## 2025-02-11 RX ADMIN — WATER 2000 MG: 1 INJECTION INTRAMUSCULAR; INTRAVENOUS; SUBCUTANEOUS at 21:14

## 2025-02-11 RX ADMIN — HYDROMORPHONE HYDROCHLORIDE 1 MG: 1 INJECTION, SOLUTION INTRAMUSCULAR; INTRAVENOUS; SUBCUTANEOUS at 23:13

## 2025-02-11 RX ADMIN — Medication 3 AMPULE: at 11:13

## 2025-02-11 RX ADMIN — TRANEXAMIC ACID 1000 MG: 100 INJECTION, SOLUTION INTRAVENOUS at 14:56

## 2025-02-11 RX ADMIN — CYCLOBENZAPRINE HYDROCHLORIDE 10 MG: 10 TABLET, FILM COATED ORAL at 17:08

## 2025-02-11 RX ADMIN — FENTANYL CITRATE 100 MCG: 50 INJECTION, SOLUTION INTRAMUSCULAR; INTRAVENOUS at 16:24

## 2025-02-11 RX ADMIN — SODIUM CHLORIDE, POTASSIUM CHLORIDE, SODIUM LACTATE AND CALCIUM CHLORIDE 25 ML: 600; 310; 30; 20 INJECTION, SOLUTION INTRAVENOUS at 11:13

## 2025-02-11 RX ADMIN — HYDROMORPHONE HYDROCHLORIDE 0.2 MG: 1 INJECTION, SOLUTION INTRAMUSCULAR; INTRAVENOUS; SUBCUTANEOUS at 16:05

## 2025-02-11 RX ADMIN — HYDROMORPHONE HYDROCHLORIDE 1 MG: 1 INJECTION, SOLUTION INTRAMUSCULAR; INTRAVENOUS; SUBCUTANEOUS at 20:11

## 2025-02-11 RX ADMIN — LIDOCAINE HYDROCHLORIDE 100 MG: 20 INJECTION, SOLUTION EPIDURAL; INFILTRATION; INTRACAUDAL; PERINEURAL at 14:13

## 2025-02-11 RX ADMIN — Medication 160 MG: at 14:14

## 2025-02-11 RX ADMIN — MIDAZOLAM HYDROCHLORIDE 2 MG: 1 INJECTION, SOLUTION INTRAMUSCULAR; INTRAVENOUS at 14:07

## 2025-02-11 RX ADMIN — Medication 10 MG: at 14:40

## 2025-02-11 RX ADMIN — Medication 80 MCG: at 14:40

## 2025-02-11 RX ADMIN — PREGABALIN 150 MG: 150 CAPSULE ORAL at 11:11

## 2025-02-11 RX ADMIN — DEXAMETHASONE SODIUM PHOSPHATE 8 MG: 4 INJECTION INTRA-ARTICULAR; INTRALESIONAL; INTRAMUSCULAR; INTRAVENOUS; SOFT TISSUE at 14:20

## 2025-02-11 RX ADMIN — WATER 2000 MG: 1 INJECTION INTRAMUSCULAR; INTRAVENOUS; SUBCUTANEOUS at 14:20

## 2025-02-11 RX ADMIN — SENNOSIDES AND DOCUSATE SODIUM 1 TABLET: 50; 8.6 TABLET ORAL at 21:14

## 2025-02-11 RX ADMIN — ROCURONIUM BROMIDE 50 MG: 10 INJECTION INTRAVENOUS at 14:34

## 2025-02-11 RX ADMIN — HYDROMORPHONE HYDROCHLORIDE 1 MG: 1 INJECTION, SOLUTION INTRAMUSCULAR; INTRAVENOUS; SUBCUTANEOUS at 18:27

## 2025-02-11 RX ADMIN — FENTANYL CITRATE 100 MCG: 50 INJECTION, SOLUTION INTRAMUSCULAR; INTRAVENOUS at 14:10

## 2025-02-11 RX ADMIN — DEXMEDETOMIDINE 10 MCG: 100 INJECTION, SOLUTION INTRAVENOUS at 14:07

## 2025-02-11 ASSESSMENT — PAIN DESCRIPTION - LOCATION
LOCATION: NECK
LOCATION: NECK
LOCATION: BACK
LOCATION: NECK

## 2025-02-11 ASSESSMENT — PAIN DESCRIPTION - DESCRIPTORS
DESCRIPTORS: ACHING
DESCRIPTORS: ACHING;TIGHTNESS
DESCRIPTORS: ACHING

## 2025-02-11 ASSESSMENT — PAIN - FUNCTIONAL ASSESSMENT
PAIN_FUNCTIONAL_ASSESSMENT: 0-10
PAIN_FUNCTIONAL_ASSESSMENT: ACTIVITIES ARE NOT PREVENTED
PAIN_FUNCTIONAL_ASSESSMENT: PREVENTS OR INTERFERES SOME ACTIVE ACTIVITIES AND ADLS

## 2025-02-11 ASSESSMENT — PAIN SCALES - GENERAL
PAINLEVEL_OUTOF10: 3
PAINLEVEL_OUTOF10: 9
PAINLEVEL_OUTOF10: 0
PAINLEVEL_OUTOF10: 9
PAINLEVEL_OUTOF10: 3
PAINLEVEL_OUTOF10: 0
PAINLEVEL_OUTOF10: 9
PAINLEVEL_OUTOF10: 10
PAINLEVEL_OUTOF10: 0
PAINLEVEL_OUTOF10: 8
PAINLEVEL_OUTOF10: 0
PAINLEVEL_OUTOF10: 8
PAINLEVEL_OUTOF10: 0
PAINLEVEL_OUTOF10: 0
PAINLEVEL_OUTOF10: 3
PAINLEVEL_OUTOF10: 0

## 2025-02-11 ASSESSMENT — PAIN DESCRIPTION - ORIENTATION
ORIENTATION: POSTERIOR
ORIENTATION: UPPER
ORIENTATION: RIGHT
ORIENTATION: MID;RIGHT
ORIENTATION: RIGHT
ORIENTATION: MID

## 2025-02-11 NOTE — PERIOP NOTE
No   recent   covid  test   nor     exposure    pt  reports.   Mepilex  sacrum border  preventatively applied   skin intact.    Voided   in  bathroom.    Pt  administered  tylenol and     lyrica   as  ordered   and  see  mar  Cbc   and  Cmp    done  and   sent  to lab   glucose   119.        Neuro-    pupils   deferred.   Right  and left   temporal  pulse palp.  Smile  and   tongue  pertrussion symmetricall    right  and left  radial pulse palp  ,brisk  cap  refilll   grasp   strong  and  equal   ,right and left   dp  and pt pulse palp.     Brisk  cap  refill   strong  foot    pushes.  Pt   alert and oriented   x4    speech  clear Pt  ready  and  awaiting  surgery   call  bell in reach.

## 2025-02-11 NOTE — ANESTHESIA PRE PROCEDURE
Department of Anesthesiology  Preprocedure Note       Name:  Tin Mckeon   Age:  59 y.o.  :  1965                                          MRN:  [id number]         Date:  2025      Surgeon: Surgeon(s):  Thang Zhou MD    Procedure: Procedure(s):  C4-C7 ANTERIOR CERVICAL DISCECTOMY AND FUSION    Medications prior to admission:   Prior to Admission medications    Medication Sig Start Date End Date Taking? Authorizing Provider   losartan-hydroCHLOROthiazide (HYZAAR) 100-25 MG per tablet Take 1 tablet by mouth daily   Yes Arthur Islas MD   baclofen (LIORESAL) 10 MG tablet Take 1 tablet by mouth 2 times daily 20  Yes Automatic Reconciliation, Ar   Empagliflozin-metFORMIN HCl ER (SYNJARDY XR) 12.5-1000 MG TB24 Take 1 tablet by mouth 2 times daily 10/22/20  Yes Automatic Reconciliation, Ar   gabapentin (NEURONTIN) 300 MG capsule Take 2 capsules by mouth 2 times daily. 20  Yes Automatic Reconciliation, Ar   finasteride (PROSCAR) 5 MG tablet Take 1 tablet by mouth daily    Atrhur Islas MD   Multiple Vitamins-Minerals (THERAPEUTIC MULTIVITAMIN-MINERALS) tablet Take 1 tablet by mouth daily    Arthur Islas MD   Chorionic Gonadotropin (HCG IJ) Inject 2,000 Units as directed four times a week    Arthur Islas MD   Creatine Monohydrate POWD Take 5 mg by mouth once    Arthur Islas MD   Dietary Management Product (NEOKE BCAA4) POWD Take 20 mg by mouth Daily    Arthur Islas MD   aspirin 81 MG EC tablet Take 1 tablet by mouth daily 16   Automatic Reconciliation, Ar   diclofenac (VOLTAREN) 75 MG EC tablet Take 1 tablet by mouth as needed 21   Automatic Reconciliation, Ar   Evolocumab (REPATHA SURECLICK) 140 MG/ML SOAJ INJECT 1ML UNDER THE SKIN EVERY 14 DAYS 22   Automatic Reconciliation, Ar   Semaglutide 14 MG TABS Take 14 mg by mouth every morning (before breakfast) Rybelsus    Automatic Reconciliation, Ar   tadalafil (CIALIS) 20 MG

## 2025-02-11 NOTE — H&P
Progress Notes  Thang Zhou MD (Physician)  Orthopedic Surgery  Expand All Collapse All  ASSESSMENT / PLAN:  Symptoms: Neck and low back pain.  BLE pain with walking     Pathology:   Moderate central stenosis L2-3.  Prior decompression L4-S1 on the left with right lateral recess stenosis.  Bilateral foraminal stenosis L4-S1.  Central stenosis with cord compression C4-6.  Foraminal stenosis on the left at C3-4, right C4-6, bilateral C6-7.     Interventional procedure options:  B L4-5 transforaminal epidural steroid injection.      Surgical options:  C4-7 ACDF.            Treatment plan: No orders of the defined types were placed in this encounter.        Rehab:      Medications:               NSAIDs:               Gabapentin/Pregabalin:      Work Status: Not discussed today     Miscellaneous:      Follow-up: No follow-ups on file.      MARITZA generated assessment/plan:  Assessment & Plan  1. Cervical spinal stenosis.  The MRI results indicate the presence of spinal stenosis at four distinct locations within the cervical region, specifically from C3 to C7. This condition is a result of arthritis, leading to the narrowing of the spinal canal. The stenosis is not due to a herniated disc or tumor. There is no evidence of spinal cord bruising. The patient has expressed a desire to proceed with the surgery without delay. A comprehensive discussion was held regarding the potential risks and benefits associated with surgical intervention for spinal stenosis. The patient was informed that the surgery would involve a fusion procedure, which could potentially limit mobility. However, it was emphasized that this is the most effective treatment option given the current circumstances. The patient was advised to consider scheduling the surgery within the next six months. A request for the surgery will be submitted, and arrangements will be made accordingly.   I have discussed the procedure in detail with the patient and mentioned  12/19/2024 7:06:38 AM Reason For Exam: Low back pain, unspecified Ordering Provider: MOOKIE Liao Physician: VANESA PATEL Signing Date: 12/23/2024 3:39:08 PM EXAM: MRI CERVICAL SPINE WO CONTRAST INDICATION: Low back pain, unspecified; Cervicalgia; Spondylosis without myelopathy or radiculopathy, cervical region; Other cervical disc degeneration, unspecified cervical region; Spinal stenosis, cervical region; Spinal stenosis, cervical region; Spondylosis without myelopathy or radiculopathy, lumbar region. Low back pain, unspecified Exam: MRI cervical spine. Comparisons: none Sequences: Sagittal T1, T2, and STIR. Axial T1, T2. No contrast. FINDINGS: Borderline kyphosis centered at C5. There are mild endplate degenerative changes and spurring. No fracture or marrow replacement. The visualized posterior fossa and cord signal are normal. Paraspinous soft tissues are within normal limits. Craniocervical junction: Intact. Without stenosis C2-C3: Right-sided uncovertebral degenerative change with facet arthropathy. Moderate right foraminal narrowing C3-C4: Left facet arthropathy. Moderate left foraminal narrowing C4-C5: Disc osteophytic bulge. This is asymmetric to left with right facet arthropathy. Moderate narrowing the canal with flattening of the left side of the cord. Mild to moderate right foraminal narrowing C5-C6: Disc osteophytic bulge with uncovertebral degenerative change right greater than left. Moderate severe narrowing of the canal with flattening of the cord. Severe right and mild left foraminal narrowing C6-C7: Disc osteophytic bulge with uncovertebral degenerative change. Mild to moderate narrowing of the canal. Moderate bilateral foraminal narrowing worse on the right Examination of the upper thoracic spine demonstrates no significant stenosis. IMPRESSION: 1. Multilevel degenerative change detailed by level above most significant at C5-6 Electronically signed by Juanjo Patel       I have personally

## 2025-02-12 ENCOUNTER — APPOINTMENT (OUTPATIENT)
Facility: HOSPITAL | Age: 60
End: 2025-02-12
Attending: ORTHOPAEDIC SURGERY
Payer: COMMERCIAL

## 2025-02-12 VITALS
RESPIRATION RATE: 16 BRPM | HEIGHT: 72 IN | OXYGEN SATURATION: 96 % | DIASTOLIC BLOOD PRESSURE: 84 MMHG | WEIGHT: 250 LBS | SYSTOLIC BLOOD PRESSURE: 151 MMHG | BODY MASS INDEX: 33.86 KG/M2 | TEMPERATURE: 98.1 F | HEART RATE: 80 BPM

## 2025-02-12 PROBLEM — M48.00 SPINAL STENOSIS: Status: ACTIVE | Noted: 2025-02-12

## 2025-02-12 LAB
ANION GAP SERPL CALC-SCNC: 9 MMOL/L (ref 2–12)
BUN SERPL-MCNC: 14 MG/DL (ref 6–20)
BUN/CREAT SERPL: 13 (ref 12–20)
CALCIUM SERPL-MCNC: 9 MG/DL (ref 8.5–10.1)
CHLORIDE SERPL-SCNC: 104 MMOL/L (ref 97–108)
CO2 SERPL-SCNC: 23 MMOL/L (ref 21–32)
CREAT SERPL-MCNC: 1.06 MG/DL (ref 0.7–1.3)
GLUCOSE SERPL-MCNC: 152 MG/DL (ref 65–100)
HCT VFR BLD AUTO: 48.4 % (ref 36.6–50.3)
HGB BLD-MCNC: 16.2 G/DL (ref 12.1–17)
POTASSIUM SERPL-SCNC: 4.3 MMOL/L (ref 3.5–5.1)
SODIUM SERPL-SCNC: 136 MMOL/L (ref 136–145)

## 2025-02-12 PROCEDURE — 6370000000 HC RX 637 (ALT 250 FOR IP): Performed by: ORTHOPAEDIC SURGERY

## 2025-02-12 PROCEDURE — 85018 HEMOGLOBIN: CPT

## 2025-02-12 PROCEDURE — 80048 BASIC METABOLIC PNL TOTAL CA: CPT

## 2025-02-12 PROCEDURE — 85014 HEMATOCRIT: CPT

## 2025-02-12 PROCEDURE — 6370000000 HC RX 637 (ALT 250 FOR IP): Performed by: PHYSICIAN ASSISTANT

## 2025-02-12 PROCEDURE — 97161 PT EVAL LOW COMPLEX 20 MIN: CPT

## 2025-02-12 PROCEDURE — 97165 OT EVAL LOW COMPLEX 30 MIN: CPT

## 2025-02-12 PROCEDURE — 6360000002 HC RX W HCPCS: Performed by: PHYSICIAN ASSISTANT

## 2025-02-12 PROCEDURE — 72040 X-RAY EXAM NECK SPINE 2-3 VW: CPT

## 2025-02-12 PROCEDURE — 2500000003 HC RX 250 WO HCPCS: Performed by: PHYSICIAN ASSISTANT

## 2025-02-12 PROCEDURE — G0378 HOSPITAL OBSERVATION PER HR: HCPCS

## 2025-02-12 PROCEDURE — 97535 SELF CARE MNGMENT TRAINING: CPT

## 2025-02-12 PROCEDURE — 97116 GAIT TRAINING THERAPY: CPT

## 2025-02-12 PROCEDURE — 36415 COLL VENOUS BLD VENIPUNCTURE: CPT

## 2025-02-12 RX ORDER — OXYCODONE HYDROCHLORIDE 5 MG/1
5-10 TABLET ORAL EVERY 4 HOURS PRN
Qty: 30 TABLET | Refills: 0 | Status: SHIPPED | OUTPATIENT
Start: 2025-02-12 | End: 2025-02-19

## 2025-02-12 RX ORDER — SENNA AND DOCUSATE SODIUM 50; 8.6 MG/1; MG/1
1 TABLET, FILM COATED ORAL 2 TIMES DAILY
Qty: 28 TABLET | Refills: 0 | Status: SHIPPED | OUTPATIENT
Start: 2025-02-12 | End: 2025-02-26

## 2025-02-12 RX ORDER — ACETAMINOPHEN 500 MG
1000 TABLET ORAL EVERY 6 HOURS
Status: SHIPPED | COMMUNITY
Start: 2025-02-12

## 2025-02-12 RX ADMIN — FAMOTIDINE 20 MG: 20 TABLET, FILM COATED ORAL at 08:58

## 2025-02-12 RX ADMIN — CYCLOBENZAPRINE HYDROCHLORIDE 10 MG: 10 TABLET, FILM COATED ORAL at 02:20

## 2025-02-12 RX ADMIN — SODIUM CHLORIDE, PRESERVATIVE FREE 5 ML: 5 INJECTION INTRAVENOUS at 09:01

## 2025-02-12 RX ADMIN — METFORMIN HYDROCHLORIDE 1000 MG: 500 TABLET, EXTENDED RELEASE ORAL at 08:57

## 2025-02-12 RX ADMIN — FINASTERIDE 5 MG: 5 TABLET, FILM COATED ORAL at 08:58

## 2025-02-12 RX ADMIN — TIZANIDINE 2 MG: 2 TABLET ORAL at 08:57

## 2025-02-12 RX ADMIN — HYDROMORPHONE HYDROCHLORIDE 1 MG: 1 INJECTION, SOLUTION INTRAMUSCULAR; INTRAVENOUS; SUBCUTANEOUS at 06:24

## 2025-02-12 RX ADMIN — OXYCODONE 10 MG: 5 TABLET ORAL at 08:59

## 2025-02-12 RX ADMIN — SENNOSIDES AND DOCUSATE SODIUM 1 TABLET: 50; 8.6 TABLET ORAL at 08:59

## 2025-02-12 RX ADMIN — ACETAMINOPHEN 1000 MG: 500 TABLET ORAL at 06:24

## 2025-02-12 RX ADMIN — EMPAGLIFLOZIN 10 MG: 10 TABLET, FILM COATED ORAL at 08:57

## 2025-02-12 RX ADMIN — BACLOFEN 10 MG: 10 TABLET ORAL at 08:57

## 2025-02-12 RX ADMIN — HYDROMORPHONE HYDROCHLORIDE 1 MG: 1 INJECTION, SOLUTION INTRAMUSCULAR; INTRAVENOUS; SUBCUTANEOUS at 02:11

## 2025-02-12 RX ADMIN — ACETAMINOPHEN 1000 MG: 500 TABLET ORAL at 02:12

## 2025-02-12 RX ADMIN — POLYETHYLENE GLYCOL 3350 17 G: 17 POWDER, FOR SOLUTION ORAL at 08:56

## 2025-02-12 RX ADMIN — ASPIRIN 81 MG: 81 TABLET, COATED ORAL at 08:57

## 2025-02-12 RX ADMIN — HYDROCHLOROTHIAZIDE 25 MG: 25 TABLET ORAL at 08:59

## 2025-02-12 RX ADMIN — LOSARTAN POTASSIUM 100 MG: 100 TABLET, FILM COATED ORAL at 08:58

## 2025-02-12 RX ADMIN — GABAPENTIN 600 MG: 300 CAPSULE ORAL at 08:58

## 2025-02-12 RX ADMIN — Medication 1 TABLET: at 08:56

## 2025-02-12 RX ADMIN — WATER 2000 MG: 1 INJECTION INTRAMUSCULAR; INTRAVENOUS; SUBCUTANEOUS at 06:24

## 2025-02-12 ASSESSMENT — PAIN DESCRIPTION - LOCATION
LOCATION: NECK
LOCATION: NECK;SHOULDER
LOCATION: NECK

## 2025-02-12 ASSESSMENT — PAIN SCALES - GENERAL
PAINLEVEL_OUTOF10: 9
PAINLEVEL_OUTOF10: 9
PAINLEVEL_OUTOF10: 4
PAINLEVEL_OUTOF10: 4
PAINLEVEL_OUTOF10: 9
PAINLEVEL_OUTOF10: 4
PAINLEVEL_OUTOF10: 4
PAINLEVEL_OUTOF10: 7
PAINLEVEL_OUTOF10: 9

## 2025-02-12 ASSESSMENT — PAIN - FUNCTIONAL ASSESSMENT: PAIN_FUNCTIONAL_ASSESSMENT: ACTIVITIES ARE NOT PREVENTED

## 2025-02-12 ASSESSMENT — PAIN DESCRIPTION - ORIENTATION
ORIENTATION: MID;RIGHT
ORIENTATION: MID;RIGHT
ORIENTATION: MID;UPPER
ORIENTATION: POSTERIOR
ORIENTATION: ANTERIOR
ORIENTATION: RIGHT;MID

## 2025-02-12 ASSESSMENT — PAIN DESCRIPTION - DESCRIPTORS
DESCRIPTORS: ACHING
DESCRIPTORS: SPASM
DESCRIPTORS: ACHING

## 2025-02-12 NOTE — PLAN OF CARE
Problem: Chronic Conditions and Co-morbidities  Goal: Patient's chronic conditions and co-morbidity symptoms are monitored and maintained or improved  Outcome: Progressing     Problem: ABCDS Injury Assessment  Goal: Absence of physical injury  Outcome: Progressing     Problem: Pain  Goal: Verbalizes/displays adequate comfort level or baseline comfort level  Outcome: Progressing     Problem: Safety - Adult  Goal: Free from fall injury  Outcome: Progressing

## 2025-02-12 NOTE — PROGRESS NOTES
Department of Orthopedic Surgery  Spine Service  Physician Assistant Progress Note        Subjective:  POD#1 s/p C4-7 ACDF. Notes pain in the neck and both shoulders. Was having some RUE numbness preop. Denies currently  Tolerating po  + voiding  Seen with no visitor   Vitals  VITALS:  BP (!) 152/92   Pulse 77   Temp 97.7 °F (36.5 °C) (Oral)   Resp 16   Ht 1.829 m (6')   Wt 113.4 kg (250 lb)   SpO2 96%   BMI 33.91 kg/m²     PHYSICAL EXAM:    Orientation:  alert and oriented to person, place and time    Incision:  dressing in place, clean, dry, and intact    Upper Extremity Motor :  deltoids/biceps/triceps/wirst flexion/wrist extension/finger flexion/finger extension 5/5 bilaterally  Upper Motor Neuron Signs:  None  Upper Extremity Sensory:  Intact C3-T1 distribution    ABNORMAL EXAM FINDINGS:  none    LABS:    HgB:    Lab Results   Component Value Date/Time    HGB 16.2 02/12/2025 05:10 AM     CBC with Differential:    Lab Results   Component Value Date/Time    WBC 5.2 02/11/2025 10:41 AM    RBC 5.38 02/11/2025 10:41 AM    HGB 16.2 02/12/2025 05:10 AM    HCT 48.4 02/12/2025 05:10 AM     02/11/2025 10:41 AM    MCV 91.3 02/11/2025 10:41 AM    MCH 32.2 02/11/2025 10:41 AM    MCHC 35.2 02/11/2025 10:41 AM    RDW 13.2 02/11/2025 10:41 AM    NRBC 0.0 02/11/2025 10:41 AM    NRBC 0.00 02/11/2025 10:41 AM    LYMPHOPCT 23 02/14/2021 07:02 AM    MONOPCT 9 02/14/2021 07:02 AM    EOSPCT 3 02/14/2021 07:02 AM    BASOPCT 1 02/14/2021 07:02 AM    MONOSABS 1.1 02/14/2021 07:02 AM    LYMPHSABS 2.8 02/14/2021 07:02 AM    EOSABS 0.4 02/14/2021 07:02 AM    BASOSABS 0.1 02/14/2021 07:02 AM    DIFFTYPE AUTOMATED 02/14/2021 07:02 AM       ASSESSMENT AND PLAN:    Post operative day 1 status post C4-7 ACDF    1:  expected post op pain. Preop numbness improved  2:  Activity Level:  PT/OT. [place]. WBAT  3:  Pain Control:  sched tylenol. Prn oxycodone  4:  Discharge Planning:  home. today  5:  + voiding

## 2025-02-12 NOTE — PROGRESS NOTES
OCCUPATIONAL THERAPY EVALUATION/DISCHARGE  Patient: Tin Mckeon (59 y.o. male)  Date: 2/12/2025  Primary Diagnosis: Neck pain [M54.2]  DDD (degenerative disc disease), cervical [M50.30]  Cervical spinal stenosis [M48.02]  Cervical myelopathy (HCC) [G95.9]  Radiculopathy, cervical [M54.12]  Foraminal stenosis of cervical region [M48.02]  Spinal stenosis [M48.00]  Procedure(s) (LRB):  C4-C7 ANTERIOR CERVICAL DISCECTOMY AND FUSION (N/A) 1 Day Post-Op     Precautions: Surgical Protocols         Spinal Precautions: No Bending, No Lifting, No Twisting        ASSESSMENT :  Based on the objective data below, the patient presents with scapulothoracic stiffness; however, he is demonstrating a high level of safe functional independence, completing dynamic ADLs, without DME, with Supervision.  Pt educated on cervical spinal precautions, as well as, aspen collar management, implementing clam shell technique, with good understanding demonstrated.  Pt's wife present and verbalizes good understanding of assisting pt with collar management, as well as, verbalizes good ability to assist with recommended Supervision ADL completion.  Given pt's high level of safe functional independence, as well as, good in-home PRN assist available and seated surface present in shower, no other acute OT needs identified, with OT answering pt's questions/concerns and pt/wife thanking therapist for his efforts.    Functional Outcome Measure:  The patient scored 70/100 on the Barthel Index outcome measure.      Further skilled acute occupational therapy is not indicated at this time.     PLAN :  Recommend with staff: OOB meals, Active ADL engagement    Recommendation for discharge: (in order for the patient to meet his/her long term goals):   No skilled occupational therapy    Other factors to consider for discharge: no additional factors    IF patient discharges home will need the following DME: patient owns DME required for discharge     SUBJECTIVE:  placement to push up from seated surface rather than attempt to pull self up, fully positioning self in-front of desired seated location, feeling chair on back of legs and reaching back with 1-2 UE to slowly lower self to seated position.      Patient recalled and demonstrated 3/3 cervical spine precautions with Min cues.     Patient instructed and indicated understanding the benefits of maintaining activity tolerance, functional mobility, and independence with self care tasks during acute stay  to ensure safe return home and to baseline. Encouraged patient to increase frequency and duration OOB, not sitting longer than 30 mins without marching/walking with staff, be out of bed for all meals, perform daily ADLs (as approved by RN/MD regarding bathing etc), and performing functional mobility to/from bathroom. Patient instruction and indicated understanding on body mechanics, ergonomics and gravitational force on the spine during different body positions to plan activities in prep for return home to complete basic ADLs, instrumental ADLs and back to work safely.     Bathing:   -do not submerge or soak wound in water  -follow doctors instructions on wound care and bandage   -only touch incisions with clean hands  -don't scrub over incisions and use a clean wash cloth and towel each time with bathing until incisions are healed  - shower or sponge bathe if indicated  -Patient did indicate understanding as evidenced by teach back.   -if any questions arise pt was educated to contact their surgeon's office     Dressing brace: Patient instructed and demonstrated while in front of mirror to don/doff velcro on brace using dominant side, keeping non-dominant side intact. Instruction and indicated understanding in removal of fabric pieces, placement of clean pieces, don brace, then can hand wash and allow air dry.     Dressing lower body: On the benefits to remain seated to don all clothing to increase independence with

## 2025-02-12 NOTE — PROGRESS NOTES
PHYSICAL THERAPY EVALUATION/DISCHARGE    Patient: Tin Mckeon (59 y.o. male)  Date: 2/12/2025  Primary Diagnosis: Neck pain [M54.2]  DDD (degenerative disc disease), cervical [M50.30]  Cervical spinal stenosis [M48.02]  Cervical myelopathy (HCC) [G95.9]  Radiculopathy, cervical [M54.12]  Foraminal stenosis of cervical region [M48.02]  Spinal stenosis [M48.00]  Procedure(s) (LRB):  C4-C7 ANTERIOR CERVICAL DISCECTOMY AND FUSION (N/A) 1 Day Post-Op   Precautions: Surgical Protocols         Spinal Precautions: No Bending, No Lifting, No Twisting     Required Braces or Orthoses  Cervical:  (hard collar)      ASSESSMENT AND RECOMMENDATIONS:  Based on the objective data below, the patient presents near his baseline level of function. Received patient sitting up in chair, cleared by nursing to mobilize. Reviewed log roll technique and patient feels comfortable performing as he has gotten out of bed that way multiple times in the past from previous surgeries. Also reviewed back brace schedule and BLT precautions. Ambulated with no AD for 75' and modified independence. Noted some mild foot slap patterning on R side during walking but no LOB or unsteadiness noted. No report of dizziness during mobility. At end of session patient left sitting up in chair with wife present in room. Patient has cleared therapy for discharge.    Functional Outcome Measure:  The patient scored 26/28 on the Tinetti outcome measure which is indicative of low fall risk.          Further skilled acute physical therapy is not indicated at this time.       PLAN :  Recommendation for discharge: (in order for the patient to meet his/her long term goals):   No skilled physical therapy    Other factors to consider for discharge: no additional factors    IF patient discharges home will need the following DME: none       SUBJECTIVE:   Patient stated “I have had 39 surgeries.”    OBJECTIVE DATA SUMMARY:     Past Medical History:   Diagnosis Date    Abnormal

## 2025-02-12 NOTE — CARE COORDINATION
1227 - Care team not recommending any skilled services or DME. No CM needs identified at this time, will remain available in case of additions or changes to discharge plan.    Initial note - CM acknowledged receipt of consult to assist with home health or other discharge needs. Chart review completed, pt has had surgery, therapy evaluations are pending. Pt referred during preop to [facility], if  services are needed will proceed with this agency. CM following up with care team for recommendations re potential discharge needs and will discuss with pt as appropriate.    Meggan Barajas, [facility] – [place], [place]  Care Management  [phone]

## 2025-02-12 NOTE — ANESTHESIA POSTPROCEDURE EVALUATION
Department of Anesthesiology  Postprocedure Note    Patient: Tin Mckeon  MRN: [id number]  YOB: 1965  Date of evaluation: 2/11/2025    Procedure Summary       Date: 02/11/25 Room / Location: [facility] MAIN OR  / [facility] MAIN OR    Anesthesia Start: 1407 Anesthesia Stop: 1642    Procedure: C4-C7 ANTERIOR CERVICAL DISCECTOMY AND FUSION (Spine Cervical) Diagnosis:       Neck pain      DDD (degenerative disc disease), cervical      Cervical spinal stenosis      Cervical myelopathy (HCC)      Radiculopathy, cervical      Foraminal stenosis of cervical region      (Neck pain [M54.2])      (DDD (degenerative disc disease), cervical [M50.30])      (Cervical spinal stenosis [M48.02])      (Cervical myelopathy (HCC) [G95.9])      (Radiculopathy, cervical [M54.12])      (Foraminal stenosis of cervical region [M48.02])    Providers: Thang Zhou MD Responsible Provider: Peter Porras MD    Anesthesia Type: General ASA Status: 3            Anesthesia Type: General    Dorina Phase I: Dorina Score: 8    Dorina Phase II:      Anesthesia Post Evaluation    Patient location during evaluation: PACU  Patient participation: complete - patient participated  Level of consciousness: awake and alert  Airway patency: patent  Nausea & Vomiting: no nausea  Cardiovascular status: hemodynamically stable  Respiratory status: acceptable  Hydration status: euvolemic  Multimodal analgesia pain management approach  Pain management: adequate    No notable events documented.

## 2025-02-12 NOTE — DISCHARGE SUMMARY
Spine Discharge Summary    Patient ID:  Tin Mckeon  [id number]  male  59 y.o.  1965    Admit date: 2/11/2025    Discharge date: 2/12/2025    Admitting Physician: Thang Zhou MD     Consulting Physician(s):   Treatment Team:   Thang Zhou MD Reis, Abilio A, MD Womble, Alicia R, PT  Kerley, Matthew, Meggan Omalley    Date of Surgery:   2/11/2025     Preoperative Diagnosis:  Neck pain [M54.2]  DDD (degenerative disc disease), cervical [M50.30]  Cervical spinal stenosis [M48.02]  Cervical myelopathy (HCC) [G95.9]  Radiculopathy, cervical [M54.12]  Foraminal stenosis of cervical region [M48.02]    Postoperative Diagnosis:   * No post-op diagnosis entered *    Procedure(s):  C4-C7 ANTERIOR CERVICAL DISCECTOMY AND FUSION     Anesthesia Type:   Choice     Surgeon: Thang Zhou MD                            HPI:  Pt is a 59 y.o. male who has a history of Neck pain [M54.2]  DDD (degenerative disc disease), cervical [M50.30]  Cervical spinal stenosis [M48.02]  Cervical myelopathy (HCC) [G95.9]  Radiculopathy, cervical [M54.12]  Foraminal stenosis of cervical region [M48.02]  with pain and limitations of activities of daily living who presents at this time for a C4-C7 ANTERIOR CERVICAL DISCECTOMY AND FUSION  following the failure of conservative management.    PMH:   Past Medical History:   Diagnosis Date    Abnormal nuclear cardiac imaging test 8/23/2016    Arthritis     CAD (coronary artery disease)     Congestive heart failure (HCC)     Diabetes (HCC)     Essential hypertension     Hyperlipidemia     PTSD (post-traumatic stress disorder)     S/P coronary artery stent placement 8/23/2016 8/22/16 PCI/ALDO to LAD    Sleep apnea     Squamous cell skin cancer     shoulders       Body mass index is 33.91 kg/m². : A BMI > 30 is classified as obesity and > 40 is classified as morbid obesity.     Medications upon admission :   Prior to Admission Medications   Prescriptions Last Dose Informant Patient Reported?  Taking?   Chorionic Gonadotropin (HCG IJ) 2/6/2025  Yes No   Sig: Inject 2,000 Units as directed four times a week   Creatine Monohydrate POWD 2/4/2025  Yes No   Sig: Take 5 mg by mouth once   Dietary Management Product (NEOKE BCAA4) POWD 2/3/2025  Yes No   Sig: Take 20 mg by mouth Daily   Empagliflozin-metFORMIN HCl ER (SYNJARDY XR) 12.5-1000 MG TB24 2/10/2025  Yes Yes   Sig: Take 1 tablet by mouth 2 times daily   Evolocumab (REPATHA SURECLICK) 140 MG/ML SOAJ 2/8/2025  Yes No   Sig: INJECT 1ML UNDER THE SKIN EVERY 14 DAYS   Multiple Vitamins-Minerals (THERAPEUTIC MULTIVITAMIN-MINERALS) tablet 2/3/2025  Yes No   Sig: Take 1 tablet by mouth daily   Semaglutide 14 MG TABS 2/8/2025  Yes No   Sig: Take 14 mg by mouth every morning (before breakfast) Rybelsus   Testosterone Enanthate (XYOSTED) 100 MG/0.5ML SOAJ 2/8/2025  Yes No   Sig: INJECT 100 MG SUBCUTANEOUSLY ONCE A WEEK   aspirin 81 MG EC tablet 2/4/2025  Yes No   Sig: Take 1 tablet by mouth daily   baclofen (LIORESAL) 10 MG tablet 2/10/2025 at 2000  Yes Yes   Sig: Take 1 tablet by mouth 2 times daily   diclofenac (VOLTAREN) 75 MG EC tablet 1/27/2025  Yes No   Sig: Take 1 tablet by mouth as needed   finasteride (PROSCAR) 5 MG tablet 2/3/2025  Yes No   Sig: Take 1 tablet by mouth daily   gabapentin (NEURONTIN) 300 MG capsule 2/10/2025 at 2000  Yes Yes   Sig: Take 2 capsules by mouth 2 times daily.   losartan-hydroCHLOROthiazide (HYZAAR) 100-25 MG per tablet 2/10/2025 at 0800  Yes Yes   Sig: Take 1 tablet by mouth daily   tadalafil (CIALIS) 20 MG tablet Not Taking  Yes No   Sig: TAKE 1/2 TABLET BY MOUTH EVERY 2-3 DAYS   Patient not taking: Reported on 2/11/2025   tiZANidine (ZANAFLEX) 2 MG tablet 2/3/2025  Yes No   Sig: Take 1 tablet by mouth daily      Facility-Administered Medications: None        Allergies:    Allergies   Allergen Reactions    Lisinopril Cough    Statins Myalgia    Morphine Nausea And Vomiting        Hospital Course:  The patient underwent surgery.

## 2025-02-12 NOTE — PROGRESS NOTES
End of Shift Note    Bedside shift change report given to Corin (oncoming nurse) by Anabel Betts RN (offgoing nurse).  Report included the following information SBAR, Kardex, and MAR    Shift worked:  7p-7a     Shift summary and any significant changes:     Scheduled medications were given, see MAR.  IV has been flushed and is patent.  Patient did complain of pain at a level of 9 on a pain scale of 0-10 throughout my shift.  Dilaudid was administered four times and Oxycodone once, see PRN MAR.  Patient is up with the assistance of one and he does call out appropriately.  The SHANTE line was stripped during my shift.    Patient teaching and routine rounding has been done.     Concerns for physician to address:       Zone phone for oncoming shift:          Activity:  Level of Assistance: Minimal assist, patient does 75% or more  Number times ambulated in hallways past shift: 0  Number of times OOB to chair past shift: 0    Cardiac:   Cardiac Monitoring: No      Cardiac Rhythm: Sinus brynn    Access:  Current line(s): PIV     Genitourinary:   Urinary Status: Voiding, Bathroom privileges    Respiratory:   O2 Device: None (Room air)  Chronic home O2 use?: NO  Incentive spirometer at bedside: NO    GI:  Last BM (including prior to admit): 02/10/25  Current diet:  ADULT DIET; Regular; 3 carb choices (45 gm/meal)  Passing flatus: YES    Pain Management:   Patient states pain is manageable on current regimen: YES    Skin:  David Scale Score: 21  Interventions:      Patient Safety:  Fall Risk:    Fall Risk Interventions  Toilet Every 2 Hours-In Advance of Need: Yes  Hourly Visual Checks: In bed, Awake  Fall Visual Posted: Armband  Room Door Open: Deferred to promote rest  Alarm On: Bed  Patient Moved Closer to Nursing Station: No    Active Consults:   IP CONSULT TO CASE MANAGEMENT    Length of Stay:  Expected LOS:   Actual LOS: 1    Anabel Betts RN

## 2025-02-12 NOTE — PLAN OF CARE
Problem: Chronic Conditions and Co-morbidities  Goal: Patient's chronic conditions and co-morbidity symptoms are monitored and maintained or improved  2/12/2025 1300 by Corin Foster RN  Outcome: Adequate for Discharge  2/12/2025 0743 by nAabel Betts RN  Outcome: Progressing     Problem: ABCDS Injury Assessment  Goal: Absence of physical injury  2/12/2025 1300 by Corin Foster RN  Outcome: Adequate for Discharge  2/12/2025 0745 by Anabel Betts RN  Outcome: Progressing  2/12/2025 0743 by Anabel Betts RN  Outcome: Progressing     Problem: Pain  Goal: Verbalizes/displays adequate comfort level or baseline comfort level  2/12/2025 1300 by Corin Foster RN  Outcome: Adequate for Discharge  2/12/2025 0745 by Anabel Betts RN  Outcome: Progressing  2/12/2025 0743 by Anabel Betts RN  Outcome: Progressing     Problem: Safety - Adult  Goal: Free from fall injury  2/12/2025 1300 by Corin Foster RN  Outcome: Adequate for Discharge  2/12/2025 0745 by Anabel Betts RN  Outcome: Progressing  2/12/2025 0743 by Anabel Betts RN  Outcome: Progressing

## 2025-02-12 NOTE — PLAN OF CARE
Problem: Chronic Conditions and Co-morbidities  Goal: Patient's chronic conditions and co-morbidity symptoms are monitored and maintained or improved  Outcome: Progressing     Problem: ABCDS Injury Assessment  Goal: Absence of physical injury  2/12/2025 0745 by Anabel Betts RN  Outcome: Progressing  2/12/2025 0743 by Anabel Betts RN  Outcome: Progressing     Problem: Pain  Goal: Verbalizes/displays adequate comfort level or baseline comfort level  2/12/2025 0745 by Anabel Betts RN  Outcome: Progressing  2/12/2025 0743 by Anabel Betts RN  Outcome: Progressing     Problem: Safety - Adult  Goal: Free from fall injury  2/12/2025 0745 by Anabel Betts RN  Outcome: Progressing  2/12/2025 0743 by Anabel Betts RN  Outcome: Progressing

## (undated) DEVICE — HALTER TRACTION HD W/ TRI COTTON LINING FOAM LTX

## (undated) DEVICE — Device

## (undated) DEVICE — 3.0MM PRECISION NEURO (MATCH HEAD)

## (undated) DEVICE — GLOVE ORTHO 7 1/2   MSG9475

## (undated) DEVICE — SUTURE STRATAFIX SZ 3-0 30CM NONABSORB UD 26MM FS 3/8 SXMP2B412

## (undated) DEVICE — DRAIN SURG 10FR L1/8IN DIA3.2MM SIL CHN RND HUBLESS FULL

## (undated) DEVICE — SUTURE VICRYL SZ 2-0 L18IN ABSRB UD CT-1 L36MM 1/2 CIR J839D

## (undated) DEVICE — SOLUTION IRRIG 1000ML 09% SOD CHL USP PIC PLAS CONTAINER

## (undated) DEVICE — AEGIS 1" DISK 4MM HOLE, PEEL OPEN: Brand: MEDLINE

## (undated) DEVICE — DRILL 14MM: Brand: SHORELINE ACS

## (undated) DEVICE — SPONGE,PEANUT,XRAY,ST,SM,3/8",5/CARD: Brand: MEDLINE INDUSTRIES, INC.

## (undated) DEVICE — GLOVE SURG SZ 75 L12IN FNGR THK79MIL GRN LTX FREE

## (undated) DEVICE — GLOVE SURG SZ 85 L12IN FNGR THK79MIL GRN LTX FREE

## (undated) DEVICE — DRAPE,LAPAROTOMY,PCH,STERILE: Brand: MEDLINE

## (undated) DEVICE — SPONGE GZ W4XL4IN COT 12 PLY TYP VII WVN C FLD DSGN STERILE

## (undated) DEVICE — SOLUTION ANTISEP 70% ISO ALC RUBBING 4 OZ

## (undated) DEVICE — SUTURE PERMAHAND SZ 2-0 L30IN NONABSORBABLE BLK SILK W/O A305H

## (undated) DEVICE — 3M™ TEGADERM™ TRANSPARENT FILM DRESSING FRAME STYLE, 1626W, 4 IN X 4-3/4 IN (10 CM X 12 CM), 50/CT 4CT/CASE: Brand: 3M™ TEGADERM™

## (undated) DEVICE — GOWN,SIRUS,NONRNF,SETINSLV,2XL,18/CS: Brand: MEDLINE

## (undated) DEVICE — SOLUTION IRRIG 1000ML H2O PIC PLAS SHATTERPROOF CONTAINER

## (undated) DEVICE — STERILE POLYISOPRENE POWDER-FREE SURGICAL GLOVES: Brand: PROTEXIS

## (undated) DEVICE — ADHESIVE SKIN CLOSURE WND 8.661X1.5 IN 22 CM LIQUIBAND SECUR

## (undated) DEVICE — MAGNETIC INSTR DRAPE 20X16: Brand: MEDLINE INDUSTRIES, INC.

## (undated) DEVICE — APPLICATOR MEDICATED 10.5 CC SOLUTION HI LT ORNG CHLORAPREP

## (undated) DEVICE — 450 ML BOTTLE OF 0.05% CHLORHEXIDINE GLUCONATE IN 99.95% STERILE WATER FOR IRRIGATION, USP AND APPLICATOR.: Brand: IRRISEPT ANTIMICROBIAL WOUND LAVAGE

## (undated) DEVICE — BLADE ES L4IN INSUL EDGE

## (undated) DEVICE — SNAP KOVER: Brand: UNBRANDED

## (undated) DEVICE — SUTURE VICRYL + SZ 3-0 L18IN CT 1 CR ABSRB VCP838D

## (undated) DEVICE — LAMINECTOMY-MRMC: Brand: MEDLINE INDUSTRIES, INC.

## (undated) DEVICE — PIN DISTRACTOR 12 MM SS STRL 0260002202S